# Patient Record
Sex: FEMALE | Race: BLACK OR AFRICAN AMERICAN | Employment: FULL TIME | URBAN - METROPOLITAN AREA
[De-identification: names, ages, dates, MRNs, and addresses within clinical notes are randomized per-mention and may not be internally consistent; named-entity substitution may affect disease eponyms.]

---

## 2017-02-02 ENCOUNTER — DOCUMENTATION ONLY (OUTPATIENT)
Dept: VASCULAR SURGERY | Age: 37
End: 2017-02-02

## 2017-02-02 NOTE — PROGRESS NOTES
1/11/17 I spoke to pt and she was checking dates with her boyfriend to see when he could take off work. I have called back several times and I have not heard back from the pt to RS her surgery.

## 2017-02-22 ENCOUNTER — HOSPITAL ENCOUNTER (OUTPATIENT)
Dept: GENERAL RADIOLOGY | Age: 37
Discharge: HOME | End: 2017-02-22
Payer: MEDICAID

## 2017-02-22 ENCOUNTER — HOSPITAL ENCOUNTER (OUTPATIENT)
Dept: LAB | Age: 37
Discharge: HOME | End: 2017-02-22
Payer: MEDICAID

## 2017-02-22 ENCOUNTER — HOSPITAL ENCOUNTER (EMERGENCY)
Age: 37
Discharge: HOME | End: 2017-02-22
Attending: EMERGENCY MEDICINE
Payer: MEDICAID

## 2017-02-22 ENCOUNTER — HOSPITAL ENCOUNTER (OUTPATIENT)
Dept: LAB | Age: 37
Discharge: HOME | End: 2017-02-22

## 2017-02-22 VITALS
OXYGEN SATURATION: 100 % | BODY MASS INDEX: 37.12 KG/M2 | RESPIRATION RATE: 18 BRPM | SYSTOLIC BLOOD PRESSURE: 154 MMHG | TEMPERATURE: 97.9 F | WEIGHT: 231 LBS | DIASTOLIC BLOOD PRESSURE: 93 MMHG | HEIGHT: 66 IN | HEART RATE: 78 BPM

## 2017-02-22 DIAGNOSIS — J06.9 ACUTE UPPER RESPIRATORY INFECTION: Primary | ICD-10-CM

## 2017-02-22 DIAGNOSIS — J45.909 ASTHMA, ACUTE: ICD-10-CM

## 2017-02-22 LAB — SENTARA SPECIMEN COL,SENBCF: NORMAL

## 2017-02-22 PROCEDURE — 99001 SPECIMEN HANDLING PT-LAB: CPT | Performed by: INTERNAL MEDICINE

## 2017-02-22 PROCEDURE — 99282 EMERGENCY DEPT VISIT SF MDM: CPT

## 2017-02-22 PROCEDURE — 71020 XR CHEST PA LAT: CPT

## 2017-02-22 RX ORDER — AZITHROMYCIN 250 MG/1
TABLET, FILM COATED ORAL
Qty: 6 TAB | Refills: 0 | Status: SHIPPED | OUTPATIENT
Start: 2017-02-22 | End: 2018-03-12 | Stop reason: ALTCHOICE

## 2017-02-22 RX ORDER — HYDROCODONE POLISTIREX AND CHLORPHENIRAMINE POLISTIREX 10; 8 MG/5ML; MG/5ML
5 SUSPENSION, EXTENDED RELEASE ORAL
Qty: 115 ML | Refills: 0 | Status: SHIPPED | OUTPATIENT
Start: 2017-02-22 | End: 2018-03-12 | Stop reason: ALTCHOICE

## 2017-02-22 NOTE — DISCHARGE INSTRUCTIONS
Upper Respiratory Infection (Cold): Care Instructions  Your Care Instructions    An upper respiratory infection, or URI, is an infection of the nose, sinuses, or throat. URIs are spread by coughs, sneezes, and direct contact. The common cold is the most frequent kind of URI. The flu and sinus infections are other kinds of URIs. Almost all URIs are caused by viruses. Antibiotics won't cure them. But you can treat most infections with home care. This may include drinking lots of fluids and taking over-the-counter pain medicine. You will probably feel better in 4 to 10 days. The doctor has checked you carefully, but problems can develop later. If you notice any problems or new symptoms, get medical treatment right away. Follow-up care is a key part of your treatment and safety. Be sure to make and go to all appointments, and call your doctor if you are having problems. It's also a good idea to know your test results and keep a list of the medicines you take. How can you care for yourself at home? · To prevent dehydration, drink plenty of fluids, enough so that your urine is light yellow or clear like water. Choose water and other caffeine-free clear liquids until you feel better. If you have kidney, heart, or liver disease and have to limit fluids, talk with your doctor before you increase the amount of fluids you drink. · Take an over-the-counter pain medicine, such as acetaminophen (Tylenol), ibuprofen (Advil, Motrin), or naproxen (Aleve). Read and follow all instructions on the label. · Before you use cough and cold medicines, check the label. These medicines may not be safe for young children or for people with certain health problems. · Be careful when taking over-the-counter cold or flu medicines and Tylenol at the same time. Many of these medicines have acetaminophen, which is Tylenol. Read the labels to make sure that you are not taking more than the recommended dose.  Too much acetaminophen (Tylenol) can be harmful. · Get plenty of rest.  · Do not smoke or allow others to smoke around you. If you need help quitting, talk to your doctor about stop-smoking programs and medicines. These can increase your chances of quitting for good. When should you call for help? Call 911 anytime you think you may need emergency care. For example, call if:  · You have severe trouble breathing. Call your doctor now or seek immediate medical care if:  · You seem to be getting much sicker. · You have new or worse trouble breathing. · You have a new or higher fever. · You have a new rash. Watch closely for changes in your health, and be sure to contact your doctor if:  · You have a new symptom, such as a sore throat, an earache, or sinus pain. · You cough more deeply or more often, especially if you notice more mucus or a change in the color of your mucus. · You do not get better as expected. Where can you learn more? Go to http://gigi-staci.info/. Enter O936 in the search box to learn more about \"Upper Respiratory Infection (Cold): Care Instructions. \"  Current as of: June 30, 2016  Content Version: 11.1  © 5066-6327 [employer]. Care instructions adapted under license by [employer] (which disclaims liability or warranty for this information). If you have questions about a medical condition or this instruction, always ask your healthcare professional. [place] [address] any warranty or liability for your use of this information. Saline Nasal Washes: Care Instructions  Your Care Instructions  Saline nasal washes help keep the nasal passages open by washing out thick or dried mucus. This simple remedy can help relieve symptoms of allergies, sinusitis, and colds.  It also can make the nose feel more comfortable by keeping the mucous membranes moist. You may notice a little burning sensation in your nose the first few times you use the solution, but this usually gets better in a few days. Follow-up care is a key part of your treatment and safety. Be sure to make and go to all appointments, and call your doctor if you are having problems. It's also a good idea to know your test results and keep a list of the medicines you take. How can you care for yourself at home? · You can buy premixed saline solution in a squeeze bottle or other sinus rinse products at a drugstore. Read and follow the instructions on the label. · You also can make your own saline solution by adding 1 teaspoon of salt and 1 teaspoon of baking soda to 2 cups of distilled water. · If you use a homemade solution, pour a small amount into a clean bowl. Using a rubber bulb syringe, squeeze the syringe and place the tip in the salt water. Pull a small amount of the salt water into the syringe by relaxing your hand. · Sit down with your head tilted slightly back. Do not lie down. Put the tip of the bulb syringe or the squeeze bottle a little way into one of your nostrils. Gently drip or squirt a few drops into the nostril. Repeat with the other nostril. Some sneezing and gagging are normal at first.  · Gently blow your nose. · Wipe the syringe or bottle tip clean after each use. · Repeat this 2 or 3 times a day. · Use nasal washes gently if you have nosebleeds often. When should you call for help? Watch closely for changes in your health, and be sure to contact your doctor if:  · You often get nosebleeds. · You have problems doing the nasal washes. Where can you learn more? Go to http://gigi-staci.info/. Enter [id number] [id number] in the search box to learn more about \"Saline Nasal Washes: Care Instructions. \"  Current as of: July 29, 2016  Content Version: 11.1  © 0788-9659 [employer]. Care instructions adapted under license by [employer] (which disclaims liability or warranty for this information).  If you have questions about a medical condition or this instruction, always ask your healthcare professional. [place] [address] any warranty or liability for your use of this information.

## 2017-02-22 NOTE — ED PROVIDER NOTES
Patient is a 31 y.o. female presenting with cough and back pain. The history is provided by the patient. Cough   This is a new problem. Episode onset: 2 weeks ago. The problem occurs constantly. The problem has been gradually worsening. The cough is productive of sputum. There has been no fever. Associated symptoms include headaches, rhinorrhea, myalgias and wheezing. Pertinent negatives include no chest pain, no chills, no sweats, no weight loss, no eye redness, no ear congestion, no ear pain, no sore throat, no shortness of breath, no nausea, no vomiting and no confusion. She has tried cough syrup, decongestants and inhalers for the symptoms. The treatment provided mild relief. She is not a smoker. Her past medical history is significant for asthma. Back Pain    This is a new problem. The current episode started more than 1 week ago. The problem has not changed since onset. The problem occurs constantly. Patient reports not work related injury. Associated with: Coughing. The pain is present in the thoracic spine. The quality of the pain is described as aching. The pain does not radiate. The pain is at a severity of 6/10. Exacerbated by: Coughing. Associated symptoms include headaches. Pertinent negatives include no chest pain, no fever, no weight loss, no abdominal pain, no abdominal swelling, no bowel incontinence, no perianal numbness, no bladder incontinence, no dysuria, no pelvic pain, no leg pain, no paresthesias, no paresis, no tingling and no weakness. She has tried nothing for the symptoms.  The patient's surgical history non-contributory        Past Medical History:   Diagnosis Date    Acid reflux     Asthma     Depressed     FSGS (focal segmental glomerulosclerosis)     Gestational diabetes     Neuropathy        Past Surgical History:   Procedure Laterality Date    HX CHOLECYSTECTOMY      HX GYN      btl    LAP UMBILICAL HERNIA REPAIR           Family History:   Problem Relation Age of Onset  Diabetes Mother     Hypertension Mother     Stroke Mother     Diabetes Father     Hypertension Father     Cancer Paternal Aunt     Heart Disease Other        Social History     Social History    Marital status: SINGLE     Spouse name: N/A    Number of children: N/A    Years of education: N/A     Occupational History    Not on file. Social History Main Topics    Smoking status: Former Smoker     Packs/day: 1.00     Years: 10.00     Quit date: 8/4/2015    Smokeless tobacco: Former User     Quit date: 3/29/2011    Alcohol use Yes      Comment: social    Drug use: No    Sexual activity: Yes     Partners: Male     Birth control/ protection: Surgical     Other Topics Concern    Not on file     Social History Narrative         ALLERGIES: Review of patient's allergies indicates no known allergies. Review of Systems   Constitutional: Negative for chills, fever and weight loss. HENT: Positive for congestion and rhinorrhea. Negative for ear pain and sore throat. Eyes: Negative for pain and redness. Respiratory: Positive for cough and wheezing. Negative for shortness of breath. Cardiovascular: Negative for chest pain, palpitations and leg swelling. Gastrointestinal: Negative for abdominal pain, bowel incontinence, constipation, diarrhea, nausea and vomiting. Genitourinary: Negative for bladder incontinence, dysuria and pelvic pain. Musculoskeletal: Positive for back pain and myalgias. Negative for arthralgias and gait problem. Skin: Negative. Neurological: Positive for headaches. Negative for dizziness, tingling, weakness, light-headedness and paresthesias. Psychiatric/Behavioral: Negative. Negative for confusion. Vitals:    02/22/17 0927   BP: (!) 154/93   Pulse: 78   Resp: 18   Temp: 97.9 °F (36.6 °C)   SpO2: 100%   Weight: 104.8 kg (231 lb)   Height: 5' 6\" (1.676 m)            Physical Exam   Constitutional: She is oriented to person, place, and time.  She appears well-developed and well-nourished. No distress. HENT:   Head: Normocephalic and atraumatic. Right Ear: Tympanic membrane, external ear and ear canal normal.   Left Ear: Tympanic membrane, external ear and ear canal normal.   Nose: Nose normal.   Mouth/Throat: Oropharynx is clear and moist and mucous membranes are normal. No oropharyngeal exudate. Eyes: Conjunctivae and EOM are normal. Pupils are equal, round, and reactive to light. Right eye exhibits no discharge. Left eye exhibits no discharge. Neck: Normal range of motion. Cardiovascular: Normal rate, regular rhythm, normal heart sounds and intact distal pulses. Exam reveals no gallop and no friction rub. No murmur heard. Pulmonary/Chest: Effort normal and breath sounds normal. No respiratory distress. She has no wheezes. She has no rales. Abdominal: Soft. Bowel sounds are normal. There is no tenderness. Musculoskeletal: Normal range of motion. She exhibits no edema. Cervical back: Normal.        Thoracic back: She exhibits tenderness. She exhibits normal range of motion, no bony tenderness, no swelling, no edema, no deformity, no laceration, no pain, no spasm and normal pulse. Lumbar back: Normal.   Neurological: She is alert and oriented to person, place, and time. Skin: Skin is warm and dry. She is not diaphoretic. Psychiatric: She has a normal mood and affect. Her behavior is normal. Judgment and thought content normal.   Nursing note and vitals reviewed. MDM  Number of Diagnoses or Management Options  Acute upper respiratory infection:   Diagnosis management comments: DDx:  viral vs bacterial URI, asthma exacerbation, COPD, bronchitis, PNE, PE, allergies, pneumothorax, atypical CP, costochondritis/chest wall pain, HF, MI, TAA/AAA, pericarditis, trauma (cardiac contusion, rib Fx, etc), pleuritic chest pain, pleural effusion, intraabdominal process    CXR done today, reviewed, no acute cardiopulmonary process noted. IMPRESSION AND MEDICAL DECISION MAKING:  Based upon the patient's presentation with noted HPI and PE, along with the work up done in the emergency department, I believe that the patient is having a prolonged URI outside the normal time range of viral URI, yet no signs of bronchitis nor pneumonia. Given prolonged time course, will cover with antibiotics. DIAGNOSIS:  1. Upper respiratory infection. SPECIFIC PATIENT INSTRUCTIONS FROM THE PHYSICIAN WHO TREATED YOU IN THE ER TODAY:  1. Return if any concerns or worsening of condition(s)  2. Zithromax as prescribed until finished. 3. Robitussin DM syrup during the day for cough. Use the prescribed Tussionex for cough at night. 4. Over the counter Tylenol for aches and pains and if fever develops. 5. FOLLOW UP APPOINTMENT:  Your primary doctor in 3 days and your pulmonologist in 1 week. Pt results have been reviewed with them. They have been counseled regarding diagnosis, treatment, and plan. Pt verbally conveys understanding and agreement of the signs, symptoms, diagnosis, treatment and prognosis and additionally agrees to follow up as discussed. Pt also agrees with the care-plan and conveys that all of their questions have been answered. I have also provided discharge instructions for them that include: educational information regarding their diagnosis and treatment, and list of reasons why they would want to return to the ED prior to their follow-up appointment, should their condition change.    Kadi Aparicio PA-C 10:23 AM        Amount and/or Complexity of Data Reviewed  Tests in the radiology section of CPT®: reviewed  Discussion of test results with the performing providers: yes  Decide to obtain previous medical records or to obtain history from someone other than the patient: yes  Obtain history from someone other than the patient: yes  Review and summarize past medical records: yes  Discuss the patient with other providers: yes    Risk of Complications, Morbidity, and/or Mortality  Presenting problems: low  Diagnostic procedures: low  Management options: low    Patient Progress  Patient progress: stable    ED Course       Procedures    Diagnosis:   1. Acute upper respiratory infection          Disposition: home    Follow-up Information     Follow up With Details Comments Contact Info    [facility] EMERGENCY DEPT  As needed, If symptoms worsen [address] Hali Bear MD In 3 days  [address] [facility]  [address] [facility] [address] [facility] [facility]      Lorenaelysaniya Reece In 1 week  Pulmonology          Patient's Medications   Start Taking    AZITHROMYCIN (ZITHROMAX Z-ASHLEE) 250 MG TABLET    Take two tablets the first day and then one every day thereafter until completion    CHLORPHENIRAMINE-HYDROCODONE (TUSSIONEX PENNKINETIC ER) 10-8 MG/5 ML SUSPENSION    Take 5 mL by mouth every twelve (12) hours as needed for Cough. Max Daily Amount: 10 mL. Continue Taking    ALBUTEROL (PROVENTIL HFA, VENTOLIN HFA, PROAIR HFA) 90 MCG/ACTUATION INHALER    Take 2 Puffs by inhalation every four (4) hours as needed for Wheezing or Shortness of Breath (or cough). CYANOCOBALAMIN (VITAMIN B-12) 1,000 MCG TABLET    Take 1,000 mcg by mouth daily. ERGOCALCIFEROL (VITAMIN D2) 50,000 UNIT CAPSULE    Take 50,000 Units by mouth every seven (7) days. FERROUS SULFATE (IRON) 325 MG (65 MG IRON) TABLET    Take  by mouth Daily (before breakfast). LORATADINE (CLARITIN) 10 MG TABLET    Take 10 mg by mouth. PANTOPRAZOLE (PROTONIX) 40 MG TABLET    Take 40 mg by mouth daily. PREDNISONE (DELTASONE) 20 MG TABLET    Take 60 mg by mouth daily (with breakfast). These Medications have changed    No medications on file   Stop Taking    OMEGA-3 FATTY ACIDS-VITAMIN E (FISH OIL) 1,000 MG CAP    Take 1 Cap by mouth. OTHER    Unknown inhaler.

## 2017-02-22 NOTE — ED NOTES
Keren Kearney is a 31 y.o. female that was discharged in good condition. The patients diagnosis, condition and treatment were explained to  patient and aftercare instructions were given. The patient verbalized understanding. Patient armband removed and shredded.

## 2017-04-24 ENCOUNTER — HOSPITAL ENCOUNTER (OUTPATIENT)
Dept: LAB | Age: 37
Discharge: HOME | End: 2017-04-24

## 2017-04-24 LAB — SENTARA SPECIMEN COL,SENBCF: NORMAL

## 2017-04-24 PROCEDURE — 99001 SPECIMEN HANDLING PT-LAB: CPT | Performed by: INTERNAL MEDICINE

## 2017-09-05 PROBLEM — E13.9 DIABETES 1.5, MANAGED AS TYPE 2 (HCC): Status: ACTIVE | Noted: 2017-09-05

## 2017-09-05 PROBLEM — F32.A DEPRESSED: Status: ACTIVE | Noted: 2017-09-05

## 2017-09-05 PROBLEM — J45.909 ASTHMA: Status: ACTIVE | Noted: 2017-09-05

## 2017-09-05 PROBLEM — D64.9 ANEMIA: Status: ACTIVE | Noted: 2017-09-05

## 2017-09-05 PROBLEM — N05.1 FSGS (FOCAL SEGMENTAL GLOMERULOSCLEROSIS): Status: ACTIVE | Noted: 2017-09-05

## 2017-09-05 PROBLEM — N92.0 MENORRHAGIA WITH REGULAR CYCLE: Status: ACTIVE | Noted: 2017-09-05

## 2017-12-28 ENCOUNTER — HOSPITAL ENCOUNTER (EMERGENCY)
Age: 37
Discharge: HOME | End: 2017-12-28
Attending: EMERGENCY MEDICINE
Payer: MEDICAID

## 2017-12-28 ENCOUNTER — APPOINTMENT (OUTPATIENT)
Dept: GENERAL RADIOLOGY | Age: 37
End: 2017-12-28
Attending: PHYSICIAN ASSISTANT
Payer: MEDICAID

## 2017-12-28 VITALS
BODY MASS INDEX: 33.91 KG/M2 | SYSTOLIC BLOOD PRESSURE: 141 MMHG | HEART RATE: 84 BPM | HEIGHT: 66 IN | OXYGEN SATURATION: 98 % | WEIGHT: 211 LBS | TEMPERATURE: 98.4 F | DIASTOLIC BLOOD PRESSURE: 88 MMHG | RESPIRATION RATE: 18 BRPM

## 2017-12-28 DIAGNOSIS — J06.9 VIRAL URI WITH COUGH: Primary | ICD-10-CM

## 2017-12-28 LAB
FLUAV AG NPH QL IA: NEGATIVE
FLUBV AG NOSE QL IA: NEGATIVE

## 2017-12-28 PROCEDURE — 99282 EMERGENCY DEPT VISIT SF MDM: CPT

## 2017-12-28 PROCEDURE — 71020 XR CHEST PA LAT: CPT

## 2017-12-28 PROCEDURE — 87081 CULTURE SCREEN ONLY: CPT | Performed by: PHYSICIAN ASSISTANT

## 2017-12-28 PROCEDURE — 87804 INFLUENZA ASSAY W/OPTIC: CPT | Performed by: PHYSICIAN ASSISTANT

## 2017-12-28 RX ORDER — FLUTICASONE PROPIONATE 50 MCG
2 SPRAY, SUSPENSION (ML) NASAL DAILY
Qty: 1 BOTTLE | Refills: 0 | Status: SHIPPED | OUTPATIENT
Start: 2017-12-28 | End: 2018-03-12 | Stop reason: ALTCHOICE

## 2017-12-28 RX ORDER — CODEINE PHOSPHATE AND GUAIFENESIN 10; 100 MG/5ML; MG/5ML
5 SOLUTION ORAL
Qty: 120 ML | Refills: 0 | Status: SHIPPED | OUTPATIENT
Start: 2017-12-28 | End: 2018-03-12 | Stop reason: ALTCHOICE

## 2017-12-29 NOTE — ED PROVIDER NOTES
HPI Comments: 29 yo F c/o nasal congestion, sore throat, and cough x 2 days. Has been taking OTC cold medication without improvement in sx. Denies fever, chills, CP, SOB, n/v. No other complaints. Patient is a 30 y.o. female presenting with sore throat, cough, and nasal congestion. Sore Throat    Associated symptoms include congestion and cough. Pertinent negatives include no vomiting. Cough   Associated symptoms include rhinorrhea and sore throat. Pertinent negatives include no chills, no nausea and no vomiting. Nasal Congestion   Pertinent negatives include no abdominal pain. Past Medical History:   Diagnosis Date    Acid reflux     Anemia     Asthma     Depressed     Diabetes 1.5, managed as type 2 ([facility] [address]     FSGS (focal segmental glomerulosclerosis)     Gestational diabetes     Neuropathy     SAB (spontaneous ) 2004    Vaginal delivery     X2       Past Surgical History:   Procedure Laterality Date    HX CHOLECYSTECTOMY      HX GYN      btl    HX HERNIA REPAIR  2889    LAP UMBILICAL HERNIA REPAIR           Family History:   Problem Relation Age of Onset    Diabetes Mother     Hypertension Mother     Stroke Mother     Asthma Mother     Diabetes Father     Hypertension Father     Cancer Paternal Aunt     Heart Disease Son        Social History     Social History    Marital status: SINGLE     Spouse name: N/A    Number of children: N/A    Years of education: N/A     Occupational History    Not on file.      Social History Main Topics    Smoking status: Former Smoker     Packs/day: 1.00     Years: 10.00     Types: Cigarettes     Quit date: 2017    Smokeless tobacco: Never Used    Alcohol use 3.6 oz/week     3 Glasses of wine, 3 Cans of beer per week      Comment: PER MONTH    Drug use: No    Sexual activity: Yes     Partners: Male     Birth control/ protection: Surgical      Comment: BTL     Other Topics Concern    Not on file     Social History Narrative ALLERGIES: Review of patient's allergies indicates no known allergies. Review of Systems   Constitutional: Negative for chills and fever. HENT: Positive for congestion, rhinorrhea and sore throat. Respiratory: Positive for cough. Gastrointestinal: Negative for abdominal pain, nausea and vomiting. All other systems reviewed and are negative. Vitals:    12/28/17 2138   BP: 141/88   Pulse: 84   Resp: 18   Temp: 98.4 °F (36.9 °C)   SpO2: 98%   Weight: 95.7 kg (211 lb)   Height: 5' 6\" (1.676 m)            Physical Exam   Constitutional: She is oriented to person, place, and time. She appears well-developed and well-nourished. No distress. HENT:   Head: Normocephalic and atraumatic. Right Ear: Tympanic membrane and ear canal normal.   Left Ear: Tympanic membrane and ear canal normal.   Nose: Mucosal edema and rhinorrhea (clear) present. Mouth/Throat: Uvula is midline, oropharynx is clear and moist and mucous membranes are normal.   Eyes: Conjunctivae are normal.   Neck: Normal range of motion. Neck supple. Cardiovascular: Normal rate, regular rhythm and normal heart sounds. Pulmonary/Chest: Effort normal and breath sounds normal. No respiratory distress. She has no wheezes. She has no rales. Musculoskeletal: Normal range of motion. Neurological: She is alert and oriented to person, place, and time. Skin: Skin is warm and dry. Psychiatric: She has a normal mood and affect. Her behavior is normal. Judgment and thought content normal.   Nursing note and vitals reviewed.        MDM  Number of Diagnoses or Management Options  Viral URI with cough:     ED Course       Procedures    -------------------------------------------------------------------------------------------------------------------     EKG INTERPRETATIONS:      RADIOLOGY RESULTS:   XR CHEST PA LAT    (Results Pending)       LABORATORY RESULTS:  Recent Results (from the past 12 hour(s))   STREP THROAT SCREEN    Collection Time: 12/28/17  9:40 PM   Result Value Ref Range    Special Requests: NO SPECIAL REQUESTS      Strep Screen NEGATIVE       Culture result: PENDING    INFLUENZA A & B AG (RAPID TEST)    Collection Time: 12/28/17  9:40 PM   Result Value Ref Range    Influenza A Antigen NEGATIVE  NEG      Influenza B Antigen NEGATIVE  NEG             CONSULTATIONS:        PROGRESS NOTES:    10:39 PM Pt well appearing and in NAD. Flu and strep negative. CXR without acute process. Most likely viral. Will d/h to f/u with PCP for further eval.     Lengthy D/W pt regarding possible worsening of pt's condition, need for follow up and strict ED return instructions for any worsening symptoms. DISPOSITION:  ED DIAGNOSIS & DISPOSITION INFORMATION  Diagnosis:   1. Viral URI with cough          Disposition: home    Follow-up Information     Follow up With Details Comments Contact Info    Sadaf Irvin MD In 1 week For further evaluation [address] [place] [address] EMERGENCY DEPT  Immediately if symptoms worsen [address] [place] [address]  [phone]          Patient's Medications   Start Taking    FLUTICASONE (FLONASE) 50 MCG/ACTUATION NASAL SPRAY    2 Sprays by Both Nostrils route daily. GUAIFENESIN-CODEINE (ROBITUSSIN AC) 100-10 MG/5 ML SOLUTION    Take 5 mL by mouth nightly as needed for Cough. Max Daily Amount: 5 mL. Continue Taking    ALBUTEROL (PROVENTIL HFA, VENTOLIN HFA, PROAIR HFA) 90 MCG/ACTUATION INHALER    Take 2 Puffs by inhalation every four (4) hours as needed for Wheezing or Shortness of Breath (or cough). ALCOHOL SWABS PADM    Test Blood sugar three times a day 30 minutes before meals.  DX E11.9  Machine name One Touch Delica    ALUMINUM CHLORIDE (DRYSOL) 20 % EXTERNAL SOLUTION        AZITHROMYCIN (ZITHROMAX Z-ASHLEE) 250 MG TABLET    Take two tablets the first day and then one every day thereafter until completion    BLOOD-GLUCOSE METER MONITORING KIT    Test Blood sugar three times a day 30 minutes before meals. DX E11.9  Machine name One Touch Delica    BUDESONIDE (RHINOCORT AQUA) 32 MCG/ACTUATION NASAL SPRAY        CHLORPHENIRAMINE-HYDROCODONE (TUSSIONEX PENNKINETIC ER) 10-8 MG/5 ML SUSPENSION    Take 5 mL by mouth every twelve (12) hours as needed for Cough. Max Daily Amount: 10 mL. CHOLECALCIFEROL (VITAMIN D3) 1,000 UNIT TABLET    1,000 Units. CYANOCOBALAMIN (VITAMIN B-12) 1,000 MCG TABLET    Take 1,000 mcg by mouth daily. CYCLOSPORINE MODIFIED (GENGRAF, NEORAL) 25 MG CAPSULE        DIAZEPAM (VALIUM) 5 MG TABLET        ERGOCALCIFEROL (ERGOCALCIFEROL) 50,000 UNIT CAPSULE    50,000 Units. ERGOCALCIFEROL (VITAMIN D2) 50,000 UNIT CAPSULE    Take 50,000 Units by mouth every seven (7) days. FERROUS SULFATE 325 MG (65 MG IRON) TABLET    325 mg. FEXOFENADINE (ALLEGRA) 180 MG TABLET        FLUCONAZOLE (DIFLUCAN) 150 MG TABLET    150 mg. GLUCOSE BLOOD VI TEST STRIPS (ASCENSIA AUTODISC VI, ONE TOUCH ULTRA TEST VI) STRIP    Test Blood sugar three times a day 30 minutes before meals. DX E11.9  Machine name One Touch Delica    LANCETS MISC    Test Blood sugar three times a day 30 minutes before meals. DX E11.9  Machine name One Touch [employer]ica    LISINOPRIL (PRINIVIL, ZESTRIL) 5 MG TABLET        LORATADINE (CLARITIN) 10 MG TABLET    Take 10 mg by mouth. METFORMIN ER (GLUCOPHAGE XR) 500 MG TABLET    TAKE 2 TABS BY MOUTH ONCE A DAY. MONTELUKAST (SINGULAIR) 10 MG TABLET        OMEGA 3-DHA-EPA-FISH OIL (FISH OIL) 60- MG CAP    1,000 mg. PANTOPRAZOLE (PROTONIX) 40 MG TABLET    Take 40 mg by mouth daily. PANTOPRAZOLE (PROTONIX) 40 MG TABLET    40 mg. PREDNISONE (DELTASONE) 20 MG TABLET    Take 60 mg by mouth daily (with breakfast).    These Medications have changed    No medications on file   Stop Taking    No medications on file

## 2017-12-29 NOTE — ED NOTES
Patient and daughter called to Triage 2 for discharge. Patient expressed her displeasure over seeing a PA and her care. Discharge halted, PA asked to speak with patient. Per PA patient requesting x ray. Patient and daughter moved to [facility] for ease of care. Mother states again that she is displeased with care, requesting patient care manager. Charge nurse notified.

## 2017-12-30 LAB
B-HEM STREP THROAT QL CULT: NEGATIVE
BACTERIA SPEC CULT: NORMAL
SERVICE CMNT-IMP: NORMAL

## 2018-02-19 ENCOUNTER — OFFICE VISIT (OUTPATIENT)
Dept: ORTHOPEDIC SURGERY | Age: 38
End: 2018-02-19

## 2018-02-19 VITALS
HEART RATE: 79 BPM | SYSTOLIC BLOOD PRESSURE: 131 MMHG | HEIGHT: 66 IN | TEMPERATURE: 98 F | WEIGHT: 217 LBS | BODY MASS INDEX: 34.87 KG/M2 | OXYGEN SATURATION: 99 % | DIASTOLIC BLOOD PRESSURE: 80 MMHG

## 2018-02-19 DIAGNOSIS — M76.31 IT BAND SYNDROME, RIGHT: ICD-10-CM

## 2018-02-19 DIAGNOSIS — G89.29 CHRONIC PAIN OF LEFT ANKLE: ICD-10-CM

## 2018-02-19 DIAGNOSIS — M76.822 LEFT TIBIALIS POSTERIOR TENDINITIS: Primary | ICD-10-CM

## 2018-02-19 DIAGNOSIS — M25.551 RIGHT HIP PAIN: ICD-10-CM

## 2018-02-19 DIAGNOSIS — M25.572 CHRONIC PAIN OF LEFT ANKLE: ICD-10-CM

## 2018-02-19 DIAGNOSIS — M25.561 ACUTE PAIN OF RIGHT KNEE: ICD-10-CM

## 2018-02-19 NOTE — PROGRESS NOTES
AMBULATORY PROGRESS NOTE      Patient: Meli Wong             MRN: [id number]     SSN: [id number] Body mass index is 35.02 kg/(m^2). YOB: 1980     AGE: 30 y.o. EX: female    PCP: Steven Burroughs MD    IMPRESSION/DIAGNOSIS AND TREATMENT PLAN     DIAGNOSES    1. Left tibialis posterior tendinitis    2. It band syndrome, right    3. Acute pain of right knee    4. Chronic pain of left ankle    5. Right hip pain        Orders Placed This Encounter    [00389] Knee 4V    [79271] Ankle Min 3V    MRI ANKLE LT WO CONT    [51383] Femur, Min 2 Views    [53924] Hip Unilateral with Pelvis 1 VIEW    REFERRAL TO PHYSICAL THERAPY      Bo Harper understands her diagnoses and the proposed plan. As such, in this individual who has had pain and discomfort in her left ankle for many years, I am uncertain of the etiology of her continued pain and discomfort. She has tenderness posteromedially and anterolaterally. My overall impression, however, is posterior tibial tendinitis, actually, and I think with some planovalgus alignment, it may be causing her some sinus tarsi impingement or anterolateral impaction type syndrome laterally. I am recommending an MRI of her left hindfoot to assess her ankle. I am also seeing her for right hip pain and right distal thigh pain, for which I think she has iliotibial band syndrome, for which I am recommending some formal physical therapy. Should her symptoms worsen, of course we will see her sooner. DIAGNOSTIC STUDIES:  She had x-rays of her left ankle, three views. These films show normal alignment of her left ankle, no osteolytic or osteoblastic lesions are seen of her left ankle. No fracture, subluxation, or dislocation. The soft tissue dimensions are well within normal limits when looking at the ankle films, these three views. These are the soft tissue dimensions distally.   However, along the ankle, there is some soft tissue swelling seen at the ankle and medial malleolus and some soft tissue swelling seen laterally, so there is a lucency seen also to the medial malleolus in the AP view. The mortise films show normal alignment of the ankle. The lateral films show normal alignment. There is no fracture, subluxation, or dislocation. The AP of the pelvis, to include the right femur films, show some mild OA of her left hip laterally, and some mild OA of her right hip socket laterally. Otherwise, I see no osteolytic or osteoblastic lesions are seen to her full-length, right femur films. Right knee films:  There is some mild medial joint space narrowing of the right knee. No fracture, subluxation, or dislocation. The joint spaces are well-preserved. The patellofemoral joint articulation appears to be fairly well preserved. There is just some slight lateralization of the patella. The left knee films, comparison, shows the medial and lateral joint spaces are well-maintained. There is just slight narrowing medially, but otherwise, no osteolytic or osteoblastic lesions are seen to the left knee on her comparison left knee films today. IMPRESSION:  A 56-year-old female with:    1. Right IT band tendinitis. Recommendation is for formal physical therapy. 2. Left medial ankle pain, lateral ankle pain, posterior tibial tendinitis. I am concerned about the lucency I see in the medial malleolar region. Recommendation is for an MRI of her left hindfoot that includes her ankle to assess her medial malleolus, medial deltoid structures, posterior tibial tendon, and the anterolateral structures of her left ankle and the sinus tarsi region primarily. Plan:    1) MRI without IV Contrast of the Left ankle  2) Referral for Physical Therapy: Low frequency US, Graston technique.      RTO - After MRI    HPI AND EXAMINATION     Bo Harper IS A 30 y.o. female who presents to my outpatient office complaining of left ankle and right knee pain. Patient reports that she had pain to her left ankle for several years. She notes popping and swelling to her left ankle. Denies h/o RA, Lupus, psoriasis. FHx of gout from parents. She notes the pain is consistent throughout the day and she sleeps with her left ankle brace on. She reports that the brace and remaining nonweightbearing provides relief. She states the chronic discomfort arises from the posteromedial and anterolateral ankle. Additionally she c/o left ankle instability. As it regards her right knee. She notes having new pain when she lifts her knee and shooting pain that can extend proximally. Denies any redness or swelling. No trauma, twists, or falls involving the right knee. Of note, she mentioned a lot of her discomfort comes when she is driving and it can extend to her right hip. 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 ANKLE/FOOT left    Psychiatry: Alert, Oriented x 3; Speech normal in context and clarity,            Memory intact grossly, no involuntary movements - tremors, no dementia  Gait: slow  Tenderness: moderate tenderness to posterior tibial tendon, no to plantar fascia, no PM spring ligament, no for calf or gastrocnemius muscle regions. Moderate tenderness to anterolateral ankle. Cutaneous: mild swelling to posterior tibial tendon,            otherwise WNL   Joint Motion: Normal ROM noted to ankle/foot,  Inversion motion is not diminished. Joint / Tendon Stability: No Ankle or Subtalar instability or joint laxity.                        No peroneal sublux ability or dislocation  Alignment: Hindfoot remains in neutral position with single stance rise attempt    Hindfoot alignment when patient seated: neutral         Calcaneo fibular Impingement is not present with weight bearing   Abduction of hindfoot at TN not present, midfoot not present          Medial column collapse not present, planovalgus alignment is present          Forefoot compensatory Varus is not present  Contractures:  Achilles not present, Gastrocnemius Contractures not present  Neuro Motor/Sensory: NL/NL. Cannot Single Stance Rise. Vascular: NL foot/ankle pulses  Lymphatics: No extremity lymphedema, No calf swelling, no tenderness to calf muscles. Knees:  Right       Cutaneous: Skin intact, no abrasions, blisters, wounds, erythema       Effusion: Is not present       Crepitus:  mild PF joint crepitus       Tenderness: Tenderness: Lateral retinaculum           Tenderness to distal third of the IT band. Alignment of Knee: neutral when standing       ROM: full range of motion, pain with figure 4. Fullness or swelling: None to popliteal fossa region       Stability: No instability to anterior, posterior, varus, valgus stress testing       Thrust:  No varus thrust with gait       Neuro: Extensor mechanism is intact      HIP REGION: right    Gait slow  Cutaneous: Skin intact, redness not present, erythema not present,drainage not present , rash not present  Visible swelling: not present  ROM: Normal IR, Normal ER   Normal FLEXION, Normal EXTENSION   Instability: none noted  Effusion: difficult to assess due to soft tissues  Crepitus Knee:  PF joint crepitus not noted  Tenderness: to Groin not present, GT region present, to distal femur not present   Fullness: Popliteal region not present  Deformity: not present    CHART REVIEW     Past Medical History:   Diagnosis Date    Acid reflux     Anemia     Asthma     Depressed     [facility] 1[facility]     FSGS (focal segmental glomerulosclerosis)     Gestational diabetes     Neuropathy     SAB (spontaneous ) 2004    Vaginal delivery     X2     Current Outpatient Prescriptions   Medication Sig    fluticasone (FLONASE) 50 mcg/actuation nasal spray 2 Sprays by Both Nostrils route daily.  omega 3-dha-epa-fish oil (FISH OIL) 60- mg cap 1,000 mg.     budesonide (RHINOCORT AQUA) 32 mcg/actuation nasal spray  alcohol swabs padm Test Blood sugar three times a day 30 minutes before meals. DX E11.9  Machine name One Touch Delica    glucose blood VI test strips (ASCENSIA AUTODISC VI, ONE TOUCH ULTRA TEST VI) strip Test Blood sugar three times a day 30 minutes before meals. DX E11.9  Machine name One Touch Delica    Blood-Glucose Meter monitoring kit Test Blood sugar three times a day 30 minutes before meals. DX E11.9  Machine name One Touch Delica    cholecalciferol (VITAMIN D3) 1,000 unit tablet 1,000 Units.  cycloSPORINE modified (GENGRAF, NEORAL) 25 mg capsule     Lancets misc Test Blood sugar three times a day 30 minutes before meals. DX E11.9  Machine name One Touch Delica    metFORMIN ER (GLUCOPHAGE XR) 500 mg tablet TAKE 2 TABS BY MOUTH ONCE A DAY.  montelukast (SINGULAIR) 10 mg tablet     ferrous sulfate 325 mg (65 mg iron) tablet 325 mg.    ergocalciferol (ERGOCALCIFEROL) 50,000 unit capsule 50,000 Units.  cyanocobalamin (VITAMIN B-12) 1,000 mcg tablet Take 1,000 mcg by mouth daily.  ergocalciferol (VITAMIN D2) 50,000 unit capsule Take 50,000 Units by mouth every seven (7) days.  albuterol (PROVENTIL HFA, VENTOLIN HFA, PROAIR HFA) 90 mcg/actuation inhaler Take 2 Puffs by inhalation every four (4) hours as needed for Wheezing or Shortness of Breath (or cough).  pantoprazole (PROTONIX) 40 mg tablet Take 40 mg by mouth daily.  guaiFENesin-codeine (ROBITUSSIN AC) 100-10 mg/5 mL solution Take 5 mL by mouth nightly as needed for Cough. Max Daily Amount: 5 mL.  diazePAM (VALIUM) 5 mg tablet     aluminum chloride (DRYSOL) 20 % external solution     fexofenadine (ALLEGRA) 180 mg tablet     fluconazole (DIFLUCAN) 150 mg tablet 150 mg.    lisinopril (PRINIVIL, ZESTRIL) 5 mg tablet     pantoprazole (PROTONIX) 40 mg tablet 40 mg.    chlorpheniramine-HYDROcodone (TUSSIONEX PENNKINETIC ER) 10-8 mg/5 mL suspension Take 5 mL by mouth every twelve (12) hours as needed for Cough.  Max Daily Amount: 10 mL.  azithromycin (ZITHROMAX Z-ASHLEE) 250 mg tablet Take two tablets the first day and then one every day thereafter until completion    predniSONE (DELTASONE) 20 mg tablet Take 60 mg by mouth daily (with breakfast).  loratadine (CLARITIN) 10 mg tablet Take 10 mg by mouth. No current facility-administered medications for this visit. No Known Allergies  Past Surgical History:   Procedure Laterality Date    HX CHOLECYSTECTOMY      HX GYN      btl    HX HERNIA REPAIR  [id number]    LAP UMBILICAL HERNIA REPAIR       Social History     Occupational History    Not on file. Social History Main Topics    Smoking status: Former Smoker     Packs/day: 1.00     Years: 10.00     Types: Cigarettes     Quit date: 2/4/2017    Smokeless tobacco: Never Used    Alcohol use 3.6 oz/week     3 Glasses of wine, 3 Cans of beer per week      Comment: PER MONTH    Drug use: No    Sexual activity: Yes     Partners: Male     Birth control/ protection: Surgical      Comment: BTL     Family History   Problem Relation Age of Onset    Diabetes Mother     Hypertension Mother     Stroke Mother     Asthma Mother     Diabetes Father     Hypertension Father     Cancer Paternal Aunt     Heart Disease Son        REVIEW OF SYSTEMS : 2/19/2018  ALL BELOW ARE Negative except : SEE HPI       Constitutional: Negative for fever, chills and weight loss. Neg Weigh Loss  Cardiovascular: Negative for chest pain, claudication and leg swelling. SOB, SMITH   Gastrointestinal: Negative for  pain, N/V/D/C, Blood in stool or urine,dysuria, hematuria,        Incontinence, pelvic pain  Musculoskeletal: see HPI. Neurological: Negative for dizziness and weakness. Negative for headaches,Visual Changes, Confusion, Seizures,   Psychiatric/Behavioral: Negative for depression, memory loss and substance abuse. Extremities:  Negative for  hair changes, rash or skin lesion changes.   Hematologic: Negative for Bleeding problems, bruising, pallor or swollen lymph nodes. Peripheral Vascular: No calf pain, vascular vein tenderness to calf pain              No calf throbbing, posterior knee throbbing pain    DIAGNOSTIC IMAGING     No notes on file    [facility]     FINAL REPORT       Name: Daniel Snyder  MRN: [id number]       Outpatient  : 08 Sep 1980  HIS Order #: [id number]  [id number] [address] [employer] Visit #: [id number]  Date: 2016     TYPE OF TEST: Peripheral Venous Testing     REASON FOR TEST  Pain in limb, Limb swelling     Left Leg:-  Deep venous thrombosis:           No  Superficial venous thrombosis:    No  Deep venous insufficiency:        Yes  Superficial venous insufficiency: No     Abnormal Valve Closure Times (seconds):    Left Common Femoral:  2.5     Vein Mapping:    Diam.   Depth  (mm)     Left Great Saphenous Vein:    High Thigh:    Mid Thigh:    Low Thigh:    Knee:    High Calf:    Low Calf: Ankle:     Left Small Saphenous Vein:    SPJ:    Mid Calf: Ankle:     Giacomini:  Accessory saph.:  Berg :  Maloney :        INTERPRETATION/FINDINGS  Duplex images were obtained using 2-D gray scale, color flow and  spectral doppler analysis. 1. No evidence of left lower extremity deep venous thrombosis or  reflux  in the common femoral, femoral, popliteal, posterior tibial or   peroneal veins. Deep venous reflux of 2.5 seconds noted in the left  common femoral vein. 2. No evidence of superficial venous insufficiency identified at the  sapheno femoral or sapheno popliteal junctions in reverse  trendelenburg. 3. No reflux detected in the remaining GSV or small saphenous veins  segments assessed. 4. Multiple competent branches/tributaries noted in the thigh  measuring 3.8 mm  and 2.3 mm at the calf. 5. Patent contralateral common femoral vein without evidence of  thrombosis.   6. Triphasic doppler signals noted in the tibial vessel.     ADDITIONAL COMMENTS     I have personally reviewed the data relevant to the interpretation of  this  study.     TECHNOLOGIST: Ortega Webber RVT, BS  Signed: 11/02/2016 11:59 AM     PHYSICIAN: Junito Saleem MD  Signed: 11/02/2016 04:25 PM    Written by Jackie Glez, as dictated by Nico Johnston MD. IDr., Nico Johnston MD, confirm that all documentation is accurate.

## 2018-02-19 NOTE — PATIENT INSTRUCTIONS
Please follow up after MRI. You are advised to contact us if your condition worsens. An MRI or CT has been ordered for you. A [employer] will be contacting you to schedule the appointment as soon as it has been approved with your insurance company. Please schedule an appointment to follow up with the doctor or the physicians assistant after the MRI or CT has been conducted. Joint Pain: Care Instructions  Your Care Instructions    Many people have small aches and pains from overuse or injury to muscles and joints. Joint injuries often happen during sports or recreation, work tasks, or projects around the home. An overuse injury can happen when you put too much stress on a joint or when you do an activity that stresses the joint over and over, such as using the computer or rowing a boat. You can take action at home to help your muscles and joints get better. You should feel better in 1 to 2 weeks, but it can take 3 months or more to heal completely. Follow-up care is a key part of your treatment and safety. Be sure to make and go to all appointments, and call your doctor if you are having problems. It's also a good idea to know your test results and keep a list of the medicines you take. How can you care for yourself at home? · Do not put weight on the injured joint for at least a day or two. · For the first day or two after an injury, do not take hot showers or baths, and do not use hot packs. The heat could make swelling worse. · Put ice or a cold pack on the sore joint for 10 to 20 minutes at a time. Try to do this every 1 to 2 hours for the next 3 days (when you are awake) or until the swelling goes down. Put a thin cloth between the ice and your skin. · Wrap the injury in an elastic bandage. Do not wrap it too tightly because this can cause more swelling. · Prop up the sore joint on a pillow when you ice it or anytime you sit or lie down during the next 3 days.  Try to keep it above the level of your heart. This will help reduce swelling. · Take an over-the-counter pain medicine, such as acetaminophen (Tylenol), ibuprofen (Advil, Motrin), or naproxen (Aleve). Read and follow all instructions on the label. · After 1 or 2 days of rest, begin moving the joint gently. While the joint is still healing, you can begin to exercise using activities that do not strain or hurt the painful joint. When should you call for help? Call your doctor now or seek immediate medical care if:  ? · You have signs of infection, such as:  ¨ Increased pain, swelling, warmth, and redness. ¨ Red streaks leading from the joint. ¨ A fever. ? Watch closely for changes in your health, and be sure to contact your doctor if:  ? · Your movement or symptoms are not getting better after 1 to 2 weeks of home treatment. Where can you learn more? Go to http://gigi-staci.info/. Enter P205 in the search box to learn more about \"Joint Pain: Care Instructions. \"  Current as of: March 21, 2017  Content Version: 11.4  © 1158-0502 [employer]. Care instructions adapted under license by [employer] (which disclaims liability or warranty for this information). If you have questions about a medical condition or this instruction, always ask your healthcare professional. [place] [address] any warranty or liability for your use of this information.

## 2018-02-19 NOTE — MR AVS SNAPSHOT
[address], [address] 200 [facility] 
[phone] Patient: Tete Velasquez MRN: [id number] Visit Information Date & Time Provider Department Dept. Phone Encounter #  
 2/19/2018  9:20 AM MD [facility] [phone] Your Appointments 3/12/2018  9:30 AM  
New Patient with 127 Edward Bill MD  
[facility] Appt Note: mid/low back pain, nx, arrive 9am w/ins & id  
 [address] [facility] [address]  
[phone]  
  
   
 [address] [facility] [address] Upcoming Health Maintenance Date Due HEMOGLOBIN A1C Q6M 1980 LIPID PANEL Q1 1980 FOOT EXAM Q1 9/8/1992 MICROALBUMIN Q1 9/8/1992 EYE EXAM RETINAL OR DILATED Q1 9/8/1992 Pneumococcal 19-64 Medium Risk (1 of 1 - PPSV23) 9/8/2001 DTaP/Tdap/Td series (1 - Tdap) 9/8/2003 PAP AKA CERVICAL CYTOLOGY 9/20/2015 Allergies as of 2/19/2018  Review Complete On: 12/28/2017 By: Song Chatman RN No Known Allergies Current Immunizations  Never Reviewed No immunizations on file. Not reviewed this visit You Were Diagnosed With   
  
 Codes Comments Acute pain of right knee    -  Primary ICD-10-CM: M25.561 ICD-9-CM: 719.46 Chronic pain of left ankle     ICD-10-CM: M25.572, G89.29 ICD-9-CM: 719.47, 338.29 Left tibialis posterior tendinitis     ICD-10-CM: G53.617 ICD-9-CM: 726.72 Right hip pain     ICD-10-CM: M25.551 ICD-9-CM: 719.45 Vitals BP Pulse Temp Height(growth percentile) Weight(growth percentile) SpO2  
 131/80 79 98 °F (36.7 °C) (Oral) 5' 6\" (1.676 m) 217 lb (98.4 kg) 99% BMI OB Status Smoking Status 35.02 kg/m2 Having regular periods Former Smoker BMI and BSA Data Body Mass Index Body Surface Area 35.02 kg/m 2 2.14 m 2 Preferred Pharmacy Pharmacy Name Phone [facility][id number] Andrei Turk, [address] [facility] [facility] [phone] Your Updated Medication List  
  
   
This list is accurate as of: 2/19/18 10:59 AM.  Always use your most recent med list.  
  
  
  
  
 albuterol 90 mcg/actuation inhaler Commonly known as:  PROVENTIL HFA, VENTOLIN HFA, PROAIR HFA Take 2 Puffs by inhalation every four (4) hours as needed for Wheezing or Shortness of Breath (or cough). alcohol swabs Padm Test Blood sugar three times a day 30 minutes before meals. DX E11.9  Machine name One Touch Delica  
  
 azithromycin 250 mg tablet Commonly known as:  Ed Flow Take two tablets the first day and then one every day thereafter until completion Blood-Glucose Meter monitoring kit Test Blood sugar three times a day 30 minutes before meals. DX E11.9  Machine name One Touch Delica  
  
 budesonide 32 mcg/actuation nasal spray Commonly known as:  RHINOCORT AQUA  
  
 chlorpheniramine-HYDROcodone 10-8 mg/5 mL suspension Commonly known as:  Marthenia Likes ER Take 5 mL by mouth every twelve (12) hours as needed for Cough. Max Daily Amount: 10 mL. cholecalciferol 1,000 unit tablet Commonly known as:  VITAMIN D3  
1,000 Units. CLARITIN 10 mg tablet Generic drug:  loratadine Take 10 mg by mouth. cycloSPORINE modified 25 mg capsule Commonly known as:  GENGRAF, NEORAL  
  
 diazePAM 5 mg tablet Commonly known as:  VALIUM Drysol 20 % external solution Generic drug:  aluminum chloride  
  
 ferrous sulfate 325 mg (65 mg iron) tablet 325 mg.  
  
 fexofenadine 180 mg tablet Commonly known as:  ALLEGRA  
  
 FISH OIL 60- mg Cap Generic drug:  omega 3-dha-epa-fish oil  
1,000 mg.  
  
 fluconazole 150 mg tablet Commonly known as:  DIFLUCAN  
150 mg.  
  
 fluticasone 50 mcg/actuation nasal spray Commonly known as:  Juanis Manners 2 Sprays by Both Nostrils route daily. glucose blood VI test strips strip Commonly known as:  ASCENSIA AUTODISC VI, ONE TOUCH ULTRA TEST VI Test Blood sugar three times a day 30 minutes before meals. DX E11.9  Machine name One Touch Delica  
  
 guaiFENesin-codeine 100-10 mg/5 mL solution Commonly known as:  ROBITUSSIN AC Take 5 mL by mouth nightly as needed for Cough. Max Daily Amount: 5 mL. Lancets Misc Test Blood sugar three times a day 30 minutes before meals. DX E11.9  Machine name One Touch Delica  
  
 lisinopril 5 mg tablet Commonly known as:  PRINIVIL, ZESTRIL  
  
 metFORMIN  mg tablet Commonly known as:  GLUCOPHAGE XR  
TAKE 2 TABS BY MOUTH ONCE A DAY. montelukast 10 mg tablet Commonly known as:  SINGULAIR * pantoprazole 40 mg tablet Commonly known as:  PROTONIX Take 40 mg by mouth daily. * pantoprazole 40 mg tablet Commonly known as:  PROTONIX  
40 mg.  
  
 predniSONE 20 mg tablet Commonly known as:  Antonieta Nicol Take 60 mg by mouth daily (with breakfast). VITAMIN B-12 1,000 mcg tablet Generic drug:  cyanocobalamin Take 1,000 mcg by mouth daily. * VITAMIN D2 50,000 unit capsule Generic drug:  ergocalciferol Take 50,000 Units by mouth every seven (7) days. * ergocalciferol 50,000 unit capsule Commonly known as:  ERGOCALCIFEROL  
50,000 Units. * Notice: This list has 4 medication(s) that are the same as other medications prescribed for you. Read the directions carefully, and ask your doctor or other care provider to review them with you. We Performed the Following AMB POC X-RAY RADEX HIP UNILATERAL WITH PELVIS 1 VIEW [23886 CPT(R)] AMB POC XRAY, ANKLE; COMPLETE, 3+ VIE [69686 CPT(R)] AMB POC XRAY, FEMUR, MIN 2 VIEWS [76346 CPT(R)] AMB POC XRAY, KNEE; COMPLETE, 4+ VIEW [36799 CPT(R)] REFERRAL TO PHYSICAL THERAPY [QAY76 Custom] Comments: Evaluation and treatment of IT band tendinitis. Please treat two to three times a week for four weeks. Please utilize the following: low frequency US, and Graston technique. To-Do List   
 02/19/2018 Imaging:  MRI ANKLE LT WO CONT Referral Information Referral ID Referred By Referred To  
  
 [id number] Rambo Simpsonnolan Not Available Visits Status Start Date End Date 1 New Request 2/19/18 2/19/19 If your referral has a status of pending review or denied, additional information will be sent to support the outcome of this decision. Referral ID Referred By Referred To  
 [id number] CHARLENE AGUILERA [address]  
   [address] [facility] [address], [address] [facility] Phone: [phone] Fax: [phone] Visits Status Start Date End Date 1 New Request 2/19/18 2/19/19 If your referral has a status of pending review or denied, additional information will be sent to support the outcome of this decision. Patient Instructions Please follow up after MRI. You are advised to contact us if your condition worsens. An MRI or CT has been ordered for you. [employer] will be contacting you to schedule the appointment as soon as it has been approved with your insurance company. Please schedule an appointment to follow up with the doctor or the physicians assistant after the MRI or CT has been conducted. Joint Pain: Care Instructions Your Care Instructions Many people have small aches and pains from overuse or injury to muscles and joints. Joint injuries often happen during sports or recreation, work tasks, or projects around the home. An overuse injury can happen when you put too much stress on a joint or when you do an activity that stresses the joint over and over, such as using the computer or rowing a boat. You can take action at home to help your muscles and joints get better. You should feel better in 1 to 2 weeks, but it can take 3 months or more to heal completely. Follow-up care is a key part of your treatment and safety. Be sure to make and go to all appointments, and call your doctor if you are having problems. It's also a good idea to know your test results and keep a list of the medicines you take. How can you care for yourself at home? · Do not put weight on the injured joint for at least a day or two. · For the first day or two after an injury, do not take hot showers or baths, and do not use hot packs. The heat could make swelling worse. · Put ice or a cold pack on the sore joint for 10 to 20 minutes at a time. Try to do this every 1 to 2 hours for the next 3 days (when you are awake) or until the swelling goes down. Put a thin cloth between the ice and your skin. · Wrap the injury in an elastic bandage. Do not wrap it too tightly because this can cause more swelling. · Prop up the sore joint on a pillow when you ice it or anytime you sit or lie down during the next 3 days. Try to keep it above the level of your heart. This will help reduce swelling. · Take an over-the-counter pain medicine, such as acetaminophen (Tylenol), ibuprofen (Advil, Motrin), or naproxen (Aleve). Read and follow all instructions on the label. · After 1 or 2 days of rest, begin moving the joint gently. While the joint is still healing, you can begin to exercise using activities that do not strain or hurt the painful joint. When should you call for help? Call your doctor now or seek immediate medical care if: 
? · You have signs of infection, such as: 
¨ Increased pain, swelling, warmth, and redness. ¨ Red streaks leading from the joint. ¨ A fever. ? Watch closely for changes in your health, and be sure to contact your doctor if: 
? · Your movement or symptoms are not getting better after 1 to 2 weeks of home treatment. Where can you learn more? Go to http://gigi-satci.info/. Enter P205 in the search box to learn more about \"Joint Pain: Care Instructions. \" Current as of: March 21, 2017 Content Version: 11.4 © 4457-4815 [employer]. Care instructions adapted under license by [employer] (which disclaims liability or warranty for this information). If you have questions about a medical condition or this instruction, always ask your healthcare professional. [address] any warranty or liability for your use of this information. Introducing [facility]! Cristian Ford introduces [employer] patient portal. Now you can access parts of your medical record, email your doctor's office, and request medication refills online. 1. In your internet browser, go to [email or web address] 2. Click on the First Time User? Click Here link in the Sign In box. You will see the New Member Sign Up page. 3. Enter your [employer] Access Code exactly as it appears below. You will not need to use this code after youve completed the sign-up process. If you do not sign up before the expiration date, you must request a new code. · [employer] Access Code: [email or web address] Expires: 3/15/2018  9:53 AM 
 
4. Enter the last four digits of your Social Security Number (xxxx) and Date of Birth (mm/dd/yyyy) as indicated and click Submit. You will be taken to the next sign-up page. 5. Create a [employer] ID. This will be your [employer] login ID and cannot be changed, so think of one that is secure and easy to remember. 6. Create a [employer] password. You can change your password at any time. 7. Enter your Password Reset Question and Answer. This can be used at a later time if you forget your password. 8. Enter your e-mail address. You will receive e-mail notification when new information is available in [address] 9. Click Sign Up. You can now view and download portions of your medical record. 10. Click the Download Summary menu link to download a portable copy of your medical information. If you have questions, please visit the Frequently Asked Questions section of the [employer] website. Remember, [employer] is NOT to be used for urgent needs. For medical emergencies, dial 911. Now available from your iPhone and Android! Please provide this summary of care documentation to your next provider. Your primary care clinician is listed as Jessica Chen. If you have any questions after today's visit, please call [phone].

## 2018-03-12 ENCOUNTER — OFFICE VISIT (OUTPATIENT)
Dept: ORTHOPEDIC SURGERY | Age: 38
End: 2018-03-12

## 2018-03-12 VITALS
WEIGHT: 218.6 LBS | HEART RATE: 73 BPM | HEIGHT: 66 IN | BODY MASS INDEX: 35.13 KG/M2 | DIASTOLIC BLOOD PRESSURE: 81 MMHG | TEMPERATURE: 98.4 F | RESPIRATION RATE: 18 BRPM | OXYGEN SATURATION: 100 % | SYSTOLIC BLOOD PRESSURE: 131 MMHG

## 2018-03-12 DIAGNOSIS — M54.16 LUMBAR RADICULOPATHY: Primary | ICD-10-CM

## 2018-03-12 RX ORDER — DULOXETIN HYDROCHLORIDE 30 MG/1
CAPSULE, DELAYED RELEASE ORAL
Qty: 30 CAP | Refills: 0 | Status: SHIPPED | OUTPATIENT
Start: 2018-03-12 | End: 2019-05-08

## 2018-03-12 NOTE — PROGRESS NOTES
[address]  [address]  [place] [address]  Phone: [phone]  Fax: [phone]        Odalis Cardozo  : 1980  PCP: Luis Gonzalez MD      NEW PATIENT      ASSESSMENT AND PLAN     Diagnoses and all orders for this visit:    1. Lumbar radiculopathy  -     [87792] LS Spine 4V  -     DULoxetine (CYMBALTA) 30 mg capsule; 1 tab PO Q dinner    1. Advised to stay active as tolerated. 2. Trial of Cymbalta. Pt is unsure if she wishes to try this. Will call office. 3. Referral to physical therapy. 4. Pt declines Toradol injections in the office today. 5. Given information on preventing injuries. Follow-up Disposition:  Return in about 4 weeks (around 2018). CHIEF COMPLAINT  Angel Holley is seen today in consultation as self referral for complaints of low back pain       HISTORY OF PRESENT ILLNESS  Angel Holley is a 30 y.o. female. Today pt c/o back pain of 2 years duration. Pt denies any specific incident or injury that caused their pain. Her back pain bothers her every day. She has not had any coughs from her environmental allergies. She states that the middle of her low back is worse than the sides. Her pain is worse with any movement. She has not seen a chiropractor or any other physician for her pain. Pt reports pain does radiate into BLE. She has pain into her hips as well. She admits to joint swelling/tightness and pain. Pt has weakness in BLE. Pt denies saddle paresthesias. Has not had recent prednisone. Has had cortisone injections in her wrist which increased her pain. Pt in tears in the office today due to pain. Pt has tried; PT-Yes,  Non-opioid medications Yes, and spinal injections No.  Denies persistent fevers, chills, weight changes, neurogenic bowel or bladder symptoms. Pt denies recent ED visits or hospitalizations. PMHx of DM, neuropathy, renal disease.  reviewed.  Last controlled substance was in 2017 T#3 #15. She was seen by Dr. Laura Bates 3 weeks ago for hip, ankle, and knee pain. Chemistries from 2017: Normal creatinine. Is not working, last worked 2018. She was working as a CNA. Family Hx of arthritis. JAYLA screen was negative. Hx of depression, no hospitalizations. No SI/HI. Pain Assessment  3/12/2018   Location of Pain Back;Buttocks;Leg;Other (comment)   Pain Location Comment lower back, both legs   Location Modifiers -   Severity of Pain 6   Quality of Pain Aching   Quality of Pain Comment -   Duration of Pain Persistent   Duration of Pain Comment -   Frequency of Pain Constant   Date Pain First Started -   Aggravating Factors Stairs; Walking;Standing;Squatting;Kneeling;Exercise;Straightening;Stretching;Bending   Aggravating Factors Comment -   Limiting Behavior -   Relieving Factors Rest   Relieving Factors Comment -   Result of Injury No         PAST MEDICAL HISTORY   Past Medical History:   Diagnosis Date    Acid reflux     Anemia     Asthma     Depressed     Diabetes 1.5, managed as type [facility] ([facility] [address]     FSGS (focal segmental glomerulosclerosis)     Gestational diabetes     Neuropathy     SAB (spontaneous )     Vaginal delivery     X2       Past Surgical History:   Procedure Laterality Date    HX CHOLECYSTECTOMY      HX GYN      btl    HX HERNIA REPAIR  2673    LAP UMBILICAL HERNIA REPAIR         MEDICATIONS      Current Outpatient Prescriptions   Medication Sig Dispense Refill    DULoxetine (CYMBALTA) 30 mg capsule 1 tab PO Q dinner 30 Cap 0    omega 3-dha-epa-fish oil (FISH OIL) 60- mg cap 1,000 mg.  budesonide (RHINOCORT AQUA) 32 mcg/actuation nasal spray       alcohol swabs padm Test Blood sugar three times a day 30 minutes before meals. DX E11.9  Machine name One Touch Delica      glucose blood VI test strips (ASCENSIA AUTODISC VI, ONE TOUCH ULTRA TEST VI) strip Test Blood sugar three times a day 30 minutes before meals.  DX E11.9  Machine name One Touch Delica      Blood-Glucose Meter monitoring kit Test Blood sugar three times a day 30 minutes before meals. DX E11.9  Machine name One Touch Delica      cholecalciferol (VITAMIN D3) 1,000 unit tablet 1,000 Units.  cycloSPORINE modified (GENGRAF, NEORAL) 25 mg capsule       Lancets misc Test Blood sugar three times a day 30 minutes before meals. DX E11.9  Machine name One Touch Delica      metFORMIN ER (GLUCOPHAGE XR) 500 mg tablet TAKE 2 TABS BY MOUTH ONCE A DAY.  montelukast (SINGULAIR) 10 mg tablet       ferrous sulfate 325 mg (65 mg iron) tablet 325 mg.      cyanocobalamin (VITAMIN B-12) 1,000 mcg tablet Take 1,000 mcg by mouth daily.  pantoprazole (PROTONIX) 40 mg tablet Take 40 mg by mouth daily. ALLERGIES  No Known Allergies       SOCIAL HISTORY    Social History     Social History    Marital status: SINGLE     Spouse name: N/A    Number of children: N/A    Years of education: N/A     Occupational History    Not on file. Social History Main Topics    Smoking status: Former Smoker     Packs/day: 1.00     Years: 10.00     Types: Cigarettes     Quit date: 2/4/2017    Smokeless tobacco: Never Used    Alcohol use 3.6 oz/week     3 Glasses of wine, 3 Cans of beer per week      Comment: PER MONTH    Drug use: No    Sexual activity: Yes     Partners: Male     Birth control/ protection: Surgical      Comment: BTL     Other Topics Concern    Not on file     Social History Narrative       FAMILY HISTORY    Family History   Problem Relation Age of Onset    Diabetes Mother     Hypertension Mother     Stroke Mother     Asthma Mother     Diabetes Father     Hypertension Father     Cancer Paternal Aunt     Heart Disease Son          REVIEW OF SYSTEMS  Review of Systems   Constitutional: Negative for chills, fever and weight loss. Respiratory: Negative for shortness of breath. Cardiovascular: Negative for chest pain. Gastrointestinal: Negative for constipation. Negative for fecal incontinence   Genitourinary: Negative for dysuria. Negative for urinary incontinence   Musculoskeletal:        Per HPI   Skin: Negative for rash. Neurological: Positive for focal weakness. Negative for dizziness, tingling, tremors and headaches. Endo/Heme/Allergies: Does not bruise/bleed easily. Psychiatric/Behavioral: Positive for depression. The patient does not have insomnia. PHYSICAL EXAMINATION  Visit Vitals    /81    Pulse 73    Temp 98.4 °F (36.9 °C) (Oral)    Resp 18    Ht 5' 6\" (1.676 m)    Wt 218 lb 9.6 oz (99.2 kg)    SpO2 100%    BMI 35.28 kg/m2          Accompanied by self. Constitutional:  Well developed, well nourished, in no acute distress. Psychiatric: Affect and mood are appropriate. Integumentary: No rashes or abrasions noted on exposed areas. Cardiovascular/Peripheral Vascular: Intact l pulses. No peripheral edema is noted. Lymphatic:  No evidence of lymphedema. No cervical lymphadenopathy. SPINE/MUSCULOSKELETAL EXAM      Lumbar spine:  No rash, ecchymosis, or gross obliquity. No fasciculations. No focal atrophy is noted. Very limited ROM all planes. Point tenderness midline L5-S1. Tenderness to palpation at the B/L sciatic notch. Tenderness to palpation of B/L trochanteric bursa. SI joints non-tender. Sensation grossly intact to light touch. MOTOR:     Hip Flex Quads Hamstrings Ankle DF EHL Ankle PF   Right 4/5 4/5 4/5 4/5 4/5 4/5   Left 4/5 4/5 4/5 4/5 4/5 4/5       DTRs are hypoactive biceps, triceps, brachioradialis, patella, and Achilles. Straight Leg raise negative. Ambulation without assistive device. FWB. RADIOGRAPHS  Lumbar spine xray films reviewed:  1) Mild Degenerative changes at L5-S1    Written by Bell Ceja, as dictated by Darlin Ahumada MD.    I, Dr. Darlin Ahumada MD, confirm that all documentation is accurate.       Ms. Anais Pratt may have a reminder for a \"due or due soon\" health maintenance. I have asked that she contact her primary care provider for follow-up on this health maintenance.

## 2018-03-12 NOTE — PROGRESS NOTES
Verbal order entered per Dr. Tianna Turpin as documented on blue sheet:  -physical therapy (patient refused physical therapy, stating that she did not believe she had to do physical therapy to get an MRI)  -cymbalta 30 mg, 1 tab PO Qdinner, disp 30, 0 RF

## 2018-03-12 NOTE — PATIENT INSTRUCTIONS
Back Care and Preventing Injuries: Care Instructions  Your Care Instructions    You can hurt your back doing many everyday activities: lifting a heavy box, bending down to garden, exercising at the gym, and even getting out of bed. But you can keep your back strong and healthy by doing some exercises. You also can follow a few tips for sitting, sleeping, and lifting to avoid hurting your back again. Talk to your doctor before you start an exercise program. Ask for help if you want to learn more about keeping your back healthy. Follow-up care is a key part of your treatment and safety. Be sure to make and go to all appointments, and call your doctor if you are having problems. It's also a good idea to know your test results and keep a list of the medicines you take. How can you care for yourself at home? · Stay at a healthy weight to avoid strain on your lower back. · Do not smoke. Smoking increases the risk of osteoporosis, which weakens the spine. If you need help quitting, talk to your doctor about stop-smoking programs and medicines. These can increase your chances of quitting for good. · Make sure you sleep in a position that maintains your back's normal curves and on a mattress that feels comfortable. Sleep on your side with a pillow between your knees, or sleep on your back with a pillow under your knees. These positions can reduce strain on your back. · When you get out of bed, lie on your side and bend both knees. Drop your feet over the edge of the bed as you push up with both arms. Scoot to the edge of the bed. Make sure your feet are in line with your rear end (buttocks), and then stand up. · If you must stand for a long time, put one foot on a stool, ledge, or box. Exercise to strengthen your back and other muscles  · Get at least 30 minutes of exercise on most days of the week. Walking is a good choice.  You also may want to do other activities, such as running, swimming, cycling, or playing tennis or team sports. · Stretch your back muscles. Here are few exercises to try:  Alex Huma on your back with your knees bent and your feet flat on the floor. Gently pull one bent knee to your chest. Put that foot back on the floor, and then pull the other knee to your chest. Hold for 15 to 30 seconds. Repeat 2 to 4 times. ¨ Do pelvic tilts. Lie on your back with your knees bent. Tighten your stomach muscles. Pull your belly button (navel) in and up toward your ribs. You should feel like your back is pressing to the floor and your hips and pelvis are slightly lifting off the floor. Hold for 6 seconds while breathing smoothly. · Keep your core muscles strong. The muscles of your back, belly (abdomen), and buttocks support your spine. ¨ Pull in your belly, and imagine pulling your navel toward your spine. Hold this for 6 seconds, then relax. Remember to keep breathing normally as you tense your muscles. ¨ Do curl-ups. Always do them with your knees bent. Keep your low back on the floor, and curl your shoulders toward your knees using a smooth, slow motion. Keep your arms folded across your chest. If this bothers your neck, try putting your hands behind your neck (not your head), with your elbows spread apart. ¨ Lie on your back with your knees bent and your feet flat on the floor. Tighten your belly muscles, and then push with your feet and raise your buttocks up a few inches. Hold this position 6 seconds as you continue to breathe normally, then lower yourself slowly to the floor. Repeat 8 to 12 times. ¨ If you like group exercise, try Pilates or yoga. These classes have poses that strengthen the core muscles. Protect your back when you sit  · Place a small pillow, a rolled-up towel, or a lumbar roll in the curve of your back if you need extra support. · Sit in a chair that is low enough to let you place both feet flat on the floor with both knees nearly level with your hips.  If your chair or desk is too high, use a foot rest to raise your knees. · When driving, keep your knees nearly level with your hips. Sit straight, and drive with both hands on the steering wheel. Your arms should be in a slightly bent position. · Try a kneeling chair, which helps tilt your hips forward. This takes pressure off your lower back. · Try sitting on an exercise ball. It can rock from side to side, which helps keep your back loose. Lift properly  · Squat down, bending at the hips and knees only. If you need to, put one knee to the floor and extend your other knee in front of you, bent at a right angle (half kneeling). · Press your chest straight forward. This helps keep your upper back straight while keeping a slight arch in your low back. · Hold the load as close to your body as possible, at the level of your navel. · Use your feet to change direction, taking small steps. · Lead with your hips as you change direction. Keep your shoulders in line with your hips as you move. Do not twist your body. · Set down your load carefully, squatting with your knees and hips only. When should you call for help? Watch closely for changes in your health, and be sure to contact your doctor if you have any problems. Where can you learn more? Go to http://gigi-staci.info/. Enter S810 in the search box to learn more about \"Back Care and Preventing Injuries: Care Instructions. \"  Current as of: March 21, 2017  Content Version: 11.4  © 4968-7598 [employer]. Care instructions adapted under license by [employer] (which disclaims liability or warranty for this information). If you have questions about a medical condition or this instruction, always ask your healthcare professional. [address] any warranty or liability for your use of this information. Back Stretches: Exercises  Your Care Instructions  Here are some examples of exercises for stretching your back. Start each exercise slowly. Ease off the exercise if you start to have pain. Your doctor or physical therapist will tell you when you can start these exercises and which ones will work best for you. How to do the exercises  Overhead stretch    1. Stand comfortably with your feet shoulder-width apart. 2. Looking straight ahead, raise both arms over your head and reach toward the ceiling. Do not allow your head to tilt back. 3. Hold for 15 to 30 seconds, then lower your arms to your sides. 4. Repeat 2 to 4 times. Side stretch    1. Stand comfortably with your feet shoulder-width apart. 2. Raise one arm over your head, and then lean to the other side. 3. Slide your hand down your leg as you let the weight of your arm gently stretch your side muscles. Hold for 15 to 30 seconds. 4. Repeat 2 to 4 times on each side. Press-up    1. Lie on your stomach, supporting your body with your forearms. 2. Press your elbows down into the floor to raise your upper back. As you do this, relax your stomach muscles and allow your back to arch without using your back muscles. As your press up, do not let your hips or pelvis come off the floor. 3. Hold for 15 to 30 seconds, then relax. 4. Repeat 2 to 4 times. Relax and rest    1. Lie on your back with a rolled towel under your neck and a pillow under your knees. Extend your arms comfortably to your sides. 2. Relax and breathe normally. 3. Remain in this position for about 10 minutes. 4. If you can, do this 2 or 3 times each day. Follow-up care is a key part of your treatment and safety. Be sure to make and go to all appointments, and call your doctor if you are having problems. It's also a good idea to know your test results and keep a list of the medicines you take. Where can you learn more? Go to http://gigi-staci.info/. Enter B267 in the search box to learn more about \"Back Stretches: Exercises. \"  Current as of: March 21, 2017  Content Version: 11.4  © 0276-0893 [employer]. Care instructions adapted under license by [employer] (which disclaims liability or warranty for this information). If you have questions about a medical condition or this instruction, always ask your healthcare professional. [address] any warranty or liability for your use of this information.

## 2018-05-24 ENCOUNTER — APPOINTMENT (OUTPATIENT)
Dept: GENERAL RADIOLOGY | Age: 38
End: 2018-05-24
Attending: EMERGENCY MEDICINE
Payer: SUBSIDIZED

## 2018-05-24 ENCOUNTER — APPOINTMENT (OUTPATIENT)
Dept: CT IMAGING | Age: 38
End: 2018-05-24
Attending: EMERGENCY MEDICINE
Payer: SUBSIDIZED

## 2018-05-24 ENCOUNTER — HOSPITAL ENCOUNTER (EMERGENCY)
Age: 38
Discharge: HOME | End: 2018-05-24
Attending: EMERGENCY MEDICINE
Payer: SUBSIDIZED

## 2018-05-24 VITALS
TEMPERATURE: 98.1 F | OXYGEN SATURATION: 100 % | SYSTOLIC BLOOD PRESSURE: 137 MMHG | WEIGHT: 215 LBS | HEART RATE: 80 BPM | DIASTOLIC BLOOD PRESSURE: 92 MMHG | BODY MASS INDEX: 38.09 KG/M2 | HEIGHT: 63 IN | RESPIRATION RATE: 16 BRPM

## 2018-05-24 DIAGNOSIS — M25.562 ARTHRALGIA OF LEFT LOWER LEG: Primary | ICD-10-CM

## 2018-05-24 DIAGNOSIS — F43.20 ADJUSTMENT DISORDER, UNSPECIFIED TYPE: ICD-10-CM

## 2018-05-24 DIAGNOSIS — R06.00 DYSPNEA, UNSPECIFIED TYPE: ICD-10-CM

## 2018-05-24 DIAGNOSIS — R91.8 PULMONARY NODULES: ICD-10-CM

## 2018-05-24 DIAGNOSIS — R03.0 ELEVATED BLOOD PRESSURE READING: ICD-10-CM

## 2018-05-24 LAB
ALBUMIN SERPL-MCNC: 3.1 G/DL (ref 3.4–5)
ALBUMIN/GLOB SERPL: 0.7 {RATIO} (ref 0.8–1.7)
ALP SERPL-CCNC: 66 U/L (ref 45–117)
ALT SERPL-CCNC: 22 U/L (ref 13–56)
ANION GAP SERPL CALC-SCNC: 8 MMOL/L (ref 3–18)
AST SERPL-CCNC: 21 U/L (ref 15–37)
ATRIAL RATE: 67 BPM
BASOPHILS # BLD: 0 K/UL (ref 0–0.06)
BASOPHILS NFR BLD: 0 % (ref 0–2)
BILIRUB SERPL-MCNC: 0.4 MG/DL (ref 0.2–1)
BUN SERPL-MCNC: 8 MG/DL (ref 7–18)
BUN/CREAT SERPL: 10 (ref 12–20)
CALCIUM SERPL-MCNC: 8.7 MG/DL (ref 8.5–10.1)
CALCULATED P AXIS, ECG09: 52 DEGREES
CALCULATED R AXIS, ECG10: 11 DEGREES
CALCULATED T AXIS, ECG11: 18 DEGREES
CHLORIDE SERPL-SCNC: 102 MMOL/L (ref 100–108)
CK MB CFR SERPL CALC: NORMAL % (ref 0–4)
CK MB SERPL-MCNC: <1 NG/ML (ref 5–25)
CK SERPL-CCNC: 138 U/L (ref 26–192)
CO2 SERPL-SCNC: 28 MMOL/L (ref 21–32)
CREAT SERPL-MCNC: 0.8 MG/DL (ref 0.6–1.3)
D DIMER PPP FEU-MCNC: 1.37 UG/ML(FEU)
DIAGNOSIS, 93000: NORMAL
DIFFERENTIAL METHOD BLD: ABNORMAL
EOSINOPHIL # BLD: 0.1 K/UL (ref 0–0.4)
EOSINOPHIL NFR BLD: 1 % (ref 0–5)
ERYTHROCYTE [DISTWIDTH] IN BLOOD BY AUTOMATED COUNT: 14.3 % (ref 11.6–14.5)
GLOBULIN SER CALC-MCNC: 4.2 G/DL (ref 2–4)
GLUCOSE SERPL-MCNC: 99 MG/DL (ref 74–99)
HCG UR QL: NEGATIVE
HCT VFR BLD AUTO: 38.6 % (ref 35–45)
HGB BLD-MCNC: 12.6 G/DL (ref 12–16)
LYMPHOCYTES # BLD: 2.7 K/UL (ref 0.9–3.6)
LYMPHOCYTES NFR BLD: 55 % (ref 21–52)
MCH RBC QN AUTO: 25.7 PG (ref 24–34)
MCHC RBC AUTO-ENTMCNC: 32.6 G/DL (ref 31–37)
MCV RBC AUTO: 78.6 FL (ref 74–97)
MONOCYTES # BLD: 0.5 K/UL (ref 0.05–1.2)
MONOCYTES NFR BLD: 10 % (ref 3–10)
NEUTS SEG # BLD: 1.7 K/UL (ref 1.8–8)
NEUTS SEG NFR BLD: 34 % (ref 40–73)
P-R INTERVAL, ECG05: 180 MS
PLATELET # BLD AUTO: 263 K/UL (ref 135–420)
PMV BLD AUTO: 10.3 FL (ref 9.2–11.8)
POTASSIUM SERPL-SCNC: 3.9 MMOL/L (ref 3.5–5.5)
PROT SERPL-MCNC: 7.3 G/DL (ref 6.4–8.2)
Q-T INTERVAL, ECG07: 404 MS
QRS DURATION, ECG06: 82 MS
QTC CALCULATION (BEZET), ECG08: 426 MS
RBC # BLD AUTO: 4.91 M/UL (ref 4.2–5.3)
SODIUM SERPL-SCNC: 138 MMOL/L (ref 136–145)
TROPONIN I SERPL-MCNC: <0.02 NG/ML (ref 0–0.06)
VENTRICULAR RATE, ECG03: 67 BPM
WBC # BLD AUTO: 5 K/UL (ref 4.6–13.2)

## 2018-05-24 PROCEDURE — 74011636320 HC RX REV CODE- 636/320: Performed by: EMERGENCY MEDICINE

## 2018-05-24 PROCEDURE — 93005 ELECTROCARDIOGRAM TRACING: CPT

## 2018-05-24 PROCEDURE — 71045 X-RAY EXAM CHEST 1 VIEW: CPT

## 2018-05-24 PROCEDURE — 85379 FIBRIN DEGRADATION QUANT: CPT | Performed by: EMERGENCY MEDICINE

## 2018-05-24 PROCEDURE — 99283 EMERGENCY DEPT VISIT LOW MDM: CPT

## 2018-05-24 PROCEDURE — 93971 EXTREMITY STUDY: CPT

## 2018-05-24 PROCEDURE — 80053 COMPREHEN METABOLIC PANEL: CPT | Performed by: EMERGENCY MEDICINE

## 2018-05-24 PROCEDURE — 81025 URINE PREGNANCY TEST: CPT | Performed by: EMERGENCY MEDICINE

## 2018-05-24 PROCEDURE — 82550 ASSAY OF CK (CPK): CPT | Performed by: EMERGENCY MEDICINE

## 2018-05-24 PROCEDURE — 71275 CT ANGIOGRAPHY CHEST: CPT

## 2018-05-24 PROCEDURE — 85025 COMPLETE CBC W/AUTO DIFF WBC: CPT | Performed by: EMERGENCY MEDICINE

## 2018-05-24 RX ORDER — ACETAMINOPHEN 325 MG/1
650 TABLET ORAL
Qty: 20 TAB | Refills: 0 | Status: SHIPPED | OUTPATIENT
Start: 2018-05-24 | End: 2018-06-21

## 2018-05-24 RX ORDER — CYCLOBENZAPRINE HCL 10 MG
10 TABLET ORAL
Qty: 12 TAB | Refills: 0 | Status: SHIPPED | OUTPATIENT
Start: 2018-05-24 | End: 2018-06-21

## 2018-05-24 RX ORDER — CYCLOBENZAPRINE HCL 10 MG
10 TABLET ORAL
Qty: 12 TAB | Refills: 0 | Status: SHIPPED | OUTPATIENT
Start: 2018-05-24 | End: 2018-05-24

## 2018-05-24 RX ADMIN — IOPAMIDOL 80 ML: 755 INJECTION, SOLUTION INTRAVENOUS at 10:51

## 2018-05-24 NOTE — ED PROVIDER NOTES
EMERGENCY DEPARTMENT HISTORY AND PHYSICAL EXAM    9:27 AM      Date: 5/24/2018  Patient Name: Kaitlyn Silva    History of Presenting Illness     Chief Complaint   Patient presents with    Leg Pain         History Provided By: Patient    Chief Complaint: Leg Pain  Duration:  Days  Timing:  Gradual and Waxing and Waning  Location: Right calf  Quality: Aching  Severity: Severe  Modifying Factors: Made worse with ambulation, mild improvement with rest.  Associated Symptoms: intermittent chest pain      Additional History (Context): Kaitlyn Silva is a 30 y.o. female with a history of GERD, anemia, and diabetes, who presents to the ED with complaint of right calf pain which began yesterday morning. Patient states that her right calf pain is made worse with ambulation and mildly improved with rest. She notes that she took Tylenol last night, without improvement in her pain. Patient also reports associated intermittent, left upper chest pain, which lasts minutes at a time. She denies worsening chest pain with ambulation. Patient denies associated shortness of breath, leg swelling, nausea, and diaphoresis. She denies recent fall, injury, or trauma which could have caused her leg pain. Patient denies recent travel or chance of pregnancy. She makes no further complaints. PCP: Nancy Montalvo MD    Current Outpatient Prescriptions   Medication Sig Dispense Refill    acetaminophen (TYLENOL) 325 mg tablet Take 2 Tabs by mouth every four (4) hours as needed for Pain. 20 Tab 0    cyclobenzaprine (FLEXERIL) 10 mg tablet Take 1 Tab by mouth three (3) times daily as needed for Muscle Spasm(s). 12 Tab 0    omega 3-dha-epa-fish oil (FISH OIL) 60- mg cap 1,000 mg.  budesonide (RHINOCORT AQUA) 32 mcg/actuation nasal spray       glucose blood VI test strips (ASCENSIA AUTODISC VI, ONE TOUCH ULTRA TEST VI) strip Test Blood sugar three times a day 30 minutes before meals.  DX E11.9  Machine name One Touch Gwynneth Sidles  cholecalciferol (VITAMIN D3) 1,000 unit tablet 1,000 Units.  cycloSPORINE modified (GENGRAF, NEORAL) 25 mg capsule       Lancets misc Test Blood sugar three times a day 30 minutes before meals. DX E11.9  Machine name One Touch [employer]      metFORMIN ER (GLUCOPHAGE XR) 500 mg tablet TAKE 2 TABS BY MOUTH ONCE A DAY.  montelukast (SINGULAIR) 10 mg tablet       ferrous sulfate 325 mg (65 mg iron) tablet 325 mg.      cyanocobalamin (VITAMIN B-12) 1,000 mcg tablet Take 1,000 mcg by mouth daily.  pantoprazole (PROTONIX) 40 mg tablet Take 40 mg by mouth daily.  DULoxetine (CYMBALTA) 30 mg capsule 1 tab PO Q dinner 30 Cap 0    alcohol swabs padm Test Blood sugar three times a day 30 minutes before meals. DX E11.9  Machine name One Touch [employer]ica      Blood-Glucose Meter monitoring kit Test Blood sugar three times a day 30 minutes before meals.  DX E11.9  Machine name One Touch [employer]         Past History     Past Medical History:  Past Medical History:   Diagnosis Date    Acid reflux     Anemia     Asthma     Depressed     Diabetes 1.5, managed as type 2 ([facility] [address]     FSGS (focal segmental glomerulosclerosis)     Gestational diabetes     Ill-defined condition     nfsg    Neuropathy     SAB (spontaneous ) 2004    Vaginal delivery     X2       Past Surgical History:  Past Surgical History:   Procedure Laterality Date    HX CHOLECYSTECTOMY      HX GYN      btl    HX HERNIA REPAIR  99    LAP UMBILICAL HERNIA REPAIR         Family History:  Family History   Problem Relation Age of Onset    Diabetes Mother     Hypertension Mother     Stroke Mother     Asthma Mother     Diabetes Father     Hypertension Father     Cancer Paternal Aunt     Heart Disease Son        Social History:  Social History   Substance Use Topics    Smoking status: Former Smoker     Packs/day: 1.00     Years: 10.00     Types: Cigarettes     Quit date: 2017    Smokeless tobacco: Never Used    Alcohol use 3.6 oz/week     3 Glasses of wine, 3 Cans of beer per week      Comment: PER MONTH       Allergies:  No Known Allergies      Review of Systems       Review of Systems   Constitutional: Negative. Negative for chills, diaphoresis and fever. HENT: Negative. Eyes: Negative. Respiratory: Negative. Negative for cough and shortness of breath. Cardiovascular: Positive for chest pain (intermittent, left upper). Negative for leg swelling. Gastrointestinal: Negative. Negative for nausea and vomiting. Endocrine: Negative. Genitourinary: Negative. Musculoskeletal: Positive for myalgias (right calf). Skin: Negative. Allergic/Immunologic: Negative. Neurological: Negative. Hematological: Negative. Psychiatric/Behavioral: Negative. All other systems reviewed and are negative. Physical Exam     Visit Vitals    BP (!) 137/92 (BP 1 Location: Left arm)    Pulse 80    Temp 98.1 °F (36.7 °C)    Resp 16    Ht 5' 3\" (1.6 m)    Wt 97.5 kg (215 lb)    SpO2 100%    BMI 38.09 kg/m2         Physical Exam   Constitutional: She is oriented to person, place, and time. She appears well-developed and well-nourished. No distress. HENT:   Head: Normocephalic and atraumatic. Eyes: Pupils are equal, round, and reactive to light. Right eye exhibits no discharge. Left eye exhibits no discharge. No scleral icterus. Neck: Normal range of motion. Neck supple. No JVD present. Cardiovascular: Normal rate and regular rhythm. Pulmonary/Chest: Effort normal and breath sounds normal. No respiratory distress. She has no wheezes. She has no rales. She exhibits no tenderness. Abdominal: Soft. There is no tenderness. Musculoskeletal: Normal range of motion. She exhibits no edema or tenderness. Neurological: She is alert and oriented to person, place, and time. Skin: Skin is warm and dry. No rash noted. She is not diaphoretic. No erythema. Nursing note and vitals reviewed.         Diagnostic Study Results     Labs -  Recent Results (from the past 12 hour(s))   EKG, 12 LEAD, INITIAL    Collection Time: 05/24/18  9:31 AM   Result Value Ref Range    Ventricular Rate 67 BPM    Atrial Rate 67 BPM    P-R Interval 180 ms    QRS Duration 82 ms    Q-T Interval 404 ms    QTC Calculation (Bezet) 426 ms    Calculated P Axis 52 degrees    Calculated R Axis 11 degrees    Calculated T Axis 18 degrees    Diagnosis       Normal sinus rhythm  Normal ECG  When compared with ECG of 20-OCT-2016 12:35,  No significant change was found     CBC WITH AUTOMATED DIFF    Collection Time: 05/24/18  9:45 AM   Result Value Ref Range    WBC 5.0 4.6 - 13.2 K/uL    RBC 4.91 4.20 - 5.30 M/uL    HGB 12.6 12.0 - 16.0 g/dL    HCT 38.6 35.0 - 45.0 %    MCV 78.6 74.0 - 97.0 FL    MCH 25.7 24.0 - 34.0 PG    MCHC 32.6 31.0 - 37.0 g/dL    RDW 14.3 11.6 - 14.5 %    PLATELET 246 253 - 948 K/uL    MPV 10.3 9.2 - 11.8 FL    NEUTROPHILS 34 (L) 40 - 73 %    LYMPHOCYTES 55 (H) 21 - 52 %    MONOCYTES 10 3 - 10 %    EOSINOPHILS 1 0 - 5 %    BASOPHILS 0 0 - 2 %    ABS. NEUTROPHILS 1.7 (L) 1.8 - 8.0 K/UL    ABS. LYMPHOCYTES 2.7 0.9 - 3.6 K/UL    ABS. MONOCYTES 0.5 0.05 - 1.2 K/UL    ABS. EOSINOPHILS 0.1 0.0 - 0.4 K/UL    ABS. BASOPHILS 0.0 0.0 - 0.06 K/UL    DF AUTOMATED     METABOLIC PANEL, COMPREHENSIVE    Collection Time: 05/24/18  9:45 AM   Result Value Ref Range    Sodium 138 136 - 145 mmol/L    Potassium 3.9 3.5 - 5.5 mmol/L    Chloride 102 100 - 108 mmol/L    CO2 28 21 - 32 mmol/L    Anion gap 8 3.0 - 18 mmol/L    Glucose 99 74 - 99 mg/dL    BUN 8 7.0 - 18 MG/DL    Creatinine 0.80 0.6 - 1.3 MG/DL    BUN/Creatinine ratio 10 (L) 12 - 20      GFR est AA >60 >60 ml/min/1.73m2    GFR est non-AA >60 >60 ml/min/1.73m2    Calcium 8.7 8.5 - 10.1 MG/DL    Bilirubin, total 0.4 0.2 - 1.0 MG/DL    ALT (SGPT) 22 13 - 56 U/L    AST (SGOT) 21 15 - 37 U/L    Alk.  phosphatase 66 45 - 117 U/L    Protein, total 7.3 6.4 - 8.2 g/dL    Albumin 3.1 (L) 3.4 - 5.0 g/dL Globulin 4.2 (H) 2.0 - 4.0 g/dL    A-G Ratio 0.7 (L) 0.8 - 1.7     CARDIAC PANEL,(CK, CKMB & TROPONIN)    Collection Time: 05/24/18  9:45 AM   Result Value Ref Range     26 - 192 U/L    CK - MB <1.0 <3.6 ng/ml    CK-MB Index  0.0 - 4.0 %     CALCULATION NOT PERFORMED WHEN RESULT IS BELOW LINEAR LIMIT    Troponin-I, Qt. <0.02 0.00 - 0.06 NG/ML   D DIMER    Collection Time: 05/24/18  9:45 AM   Result Value Ref Range    D DIMER 1.37 (H) <0.46 ug/ml(FEU)   HCG URINE, QL    Collection Time: 05/24/18 11:05 AM   Result Value Ref Range    HCG urine, QL NEGATIVE  NEG         Radiologic Studies -   DUPLEX LOWER EXT VENOUS LEFT         CTA CHEST W OR W WO CONT   Final Result      XR CHEST PORT   Final Result      Radiologist's interpretation of Chest X-Ray (Read by Dr. Aroldo Villa):  Negative chest.      Medical Decision Making   I am the first provider for this patient. I reviewed the vital signs, available nursing notes, past medical history, past surgical history, family history and social history. Vital Signs-Reviewed the patient's vital signs. EKG: Interpreted by the EP. Time Interpreted: 9:33 AM   Rate: 67 bpm   Rhythm: Normal Sinus Rhythm   Interpretation: Normal EKG, No STEMI    Records Reviewed: Nursing Notes (Time of Review: 9:27 AM)    Provider Notes (Medical Decision Making):   Patient has chest pain and leg pain. D-dimer elevated. Will obtain CTA and Doppler. Differential Diagnosis:   ACS, PE, DVT, pneumothorax      Diagnosis     Clinical Impression:   1. Arthralgia of left lower leg    2. Dyspnea, unspecified type    3. Adjustment disorder, unspecified type    4. Elevated blood pressure reading    5. Pulmonary nodules        Disposition:   10:30 AM : Pt care transferred to Dr. Adalgisa Oleary, ED provider. History of patient complaint(s), available diagnostic reports and current treatment plan has been discussed thoroughly. Bedside rounding on patient occured : yes .   Intended disposition of patient : TBD  Pending diagnostics reports and/or labs (please list): CTA Chest, LLE Duplex    _______________________________    Scribe Attestation:     Erline Jeans, acting as a scribe for and in the presence of Shawn Pinto MD      May 24, 2018 at 9:27 AM       Provider Attestation:      I personally performed the services described in the documentation, reviewed the documentation, as recorded by the scribe in my presence, and it accurately and completely records my words and actions.  May 24, 2018 at 9:27 AM - Shawn Pinto MD      _______________________________

## 2018-05-24 NOTE — ED TRIAGE NOTES
Patient c/o leg pain x 1 day denies injury. No redness no swelling patient walked in.  Patient is also c/o chest pain that started last night after the leg pain patient rates pain 7/10 for both leg and chest.

## 2018-05-24 NOTE — DISCHARGE INSTRUCTIONS
HOLD YOUR METFORMIN FOR THE NEXT 48 HOURS. Musculoskeletal Pain: Care Instructions  Your Care Instructions  Different problems with the bones, muscles, nerves, ligaments, and tendons in the body can cause pain. One or more areas of your body may ache or burn. Or they may feel tired, stiff, or sore. The medical term for this type of pain is musculoskeletal pain. It can have many different causes. Sometimes the pain is caused by an injury such as a strain or sprain. Or you might have pain from using one part of your body in the same way over and over again. This is called overuse. In some cases, the cause of the pain is another health problem such as arthritis or fibromyalgia. The doctor will examine you and ask you questions about your health to help find the cause of your pain. Blood tests or imaging tests like an X-ray may also be helpful. But sometimes doctors can't find a cause of the pain. Treatment depends on your symptoms and the cause of the pain, if known. The doctor has checked you carefully, but problems can develop later. If you notice any problems or new symptoms, get medical treatment right away. Follow-up care is a key part of your treatment and safety. Be sure to make and go to all appointments, and call your doctor if you are having problems. It's also a good idea to know your test results and keep a list of the medicines you take. How can you care for yourself at home? · Rest until you feel better. · Do not do anything that makes the pain worse. Return to exercise gradually if you feel better and your doctor says it's okay. · Be safe with medicines. Read and follow all instructions on the label. ¨ If the doctor gave you a prescription medicine for pain, take it as prescribed. ¨ If you are not taking a prescription pain medicine, ask your doctor if you can take an over-the-counter medicine. · Put ice or a cold pack on the area for 10 to 20 minutes at a time to ease pain.  Put a thin cloth between the ice and your skin. When should you call for help? Call your doctor now or seek immediate medical care if:  · You have new pain, or your pain gets worse. · You have new symptoms such as a fever, a rash, or chills. Watch closely for changes in your health, and be sure to contact your doctor if:  · You do not get better as expected. Where can you learn more? Go to [employer].be  Enter Q624 in the search box to learn more about \"Musculoskeletal Pain: Care Instructions. \"   © 0180-2995 Healthwise, Incorporated. Care instructions adapted under license by Kulwant Macedo (which disclaims liability or warranty for this information). This care instruction is for use with your licensed healthcare professional. If you have questions about a medical condition or this instruction, always ask your healthcare professional. [address] any warranty or liability for your use of this information. Content Version: 39.7.165018; Current as of: November 20, 2015             Shortness of Breath: Care Instructions  Your Care Instructions  Shortness of breath has many causes. Sometimes conditions such as anxiety can lead to shortness of breath. Some people get mild shortness of breath when they exercise. Trouble breathing also can be a symptom of a serious problem, such as asthma, lung disease, emphysema, heart problems, and pneumonia. If your shortness of breath continues, you may need tests and treatment. Watch for any changes in your breathing and other symptoms. Follow-up care is a key part of your treatment and safety. Be sure to make and go to all appointments, and call your doctor if you are having problems. It's also a good idea to know your test results and keep a list of the medicines you take. How can you care for yourself at home? · Do not smoke or allow others to smoke around you.  If you need help quitting, talk to your doctor about stop-smoking programs and medicines. These can increase your chances of quitting for good. · Get plenty of rest and sleep. · Take your medicines exactly as prescribed. Call your doctor if you think you are having a problem with your medicine. · Find healthy ways to deal with stress. ¨ Exercise daily. ¨ Get plenty of sleep. ¨ Eat regularly and well. When should you call for help? Call 911 anytime you think you may need emergency care. For example, call if:  ? · You have severe shortness of breath. ? · You have symptoms of a heart attack. These may include:  ¨ Chest pain or pressure, or a strange feeling in the chest.  ¨ Sweating. ¨ Shortness of breath. ¨ Nausea or vomiting. ¨ Pain, pressure, or a strange feeling in the back, neck, jaw, or upper belly or in one or both shoulders or arms. ¨ Lightheadedness or sudden weakness. ¨ A fast or irregular heartbeat. After you call 911, the  may tell you to chew 1 adult-strength or 2 to 4 low-dose aspirin. Wait for an ambulance. Do not try to drive yourself. ?Call your doctor now or seek immediate medical care if:  ? · Your shortness of breath gets worse or you start to wheeze. Wheezing is a high-pitched sound when you breathe. ? · You wake up at night out of breath or have to prop your head up on several pillows to breathe. ? · You are short of breath after only light activity or while at rest.   ? Watch closely for changes in your health, and be sure to contact your doctor if:  ? · You do not get better over the next 1 to 2 days. Where can you learn more? Go to http://gigi-staci.info/. Enter S780 in the search box to learn more about \"Shortness of Breath: Care Instructions. \"  Current as of: May 12, 2017  Content Version: 11.4  © 0136-9296 [employer]. Care instructions adapted under license by [employer] (which disclaims liability or warranty for this information).  If you have questions about a medical condition or this instruction, always ask your healthcare professional. [place] [address] any warranty or liability for your use of this information.

## 2018-05-24 NOTE — ED NOTES
10:41 AM: Patient care assumed from Dr. Emelia Diamond, ED provider. Patient complaint(s), current treatment plan, progression, and available diagnostic results have been discussed thoroughly. Rounding occurred: yes  Intended Disposition: TBD   Pending diagnostic reports and/or labs (please list): CTA Chest, LLE Duplex    Labs:  Recent Results (from the past 12 hour(s))   EKG, 12 LEAD, INITIAL    Collection Time: 05/24/18  9:31 AM   Result Value Ref Range    Ventricular Rate 67 BPM    Atrial Rate 67 BPM    P-R Interval 180 ms    QRS Duration 82 ms    Q-T Interval 404 ms    QTC Calculation (Bezet) 426 ms    Calculated P Axis 52 degrees    Calculated R Axis 11 degrees    Calculated T Axis 18 degrees    Diagnosis       Normal sinus rhythm  Normal ECG  When compared with ECG of 20-OCT-2016 12:35,  No significant change was found     CBC WITH AUTOMATED DIFF    Collection Time: 05/24/18  9:45 AM   Result Value Ref Range    WBC 5.0 4.6 - 13.2 K/uL    RBC 4.91 4.20 - 5.30 M/uL    HGB 12.6 12.0 - 16.0 g/dL    HCT 38.6 35.0 - 45.0 %    MCV 78.6 74.0 - 97.0 FL    MCH 25.7 24.0 - 34.0 PG    MCHC 32.6 31.0 - 37.0 g/dL    RDW 14.3 11.6 - 14.5 %    PLATELET 707 131 - 723 K/uL    MPV 10.3 9.2 - 11.8 FL    NEUTROPHILS 34 (L) 40 - 73 %    LYMPHOCYTES 55 (H) 21 - 52 %    MONOCYTES 10 3 - 10 %    EOSINOPHILS 1 0 - 5 %    BASOPHILS 0 0 - 2 %    ABS. NEUTROPHILS 1.7 (L) 1.8 - 8.0 K/UL    ABS. LYMPHOCYTES 2.7 0.9 - 3.6 K/UL    ABS. MONOCYTES 0.5 0.05 - 1.2 K/UL    ABS. EOSINOPHILS 0.1 0.0 - 0.4 K/UL    ABS.  BASOPHILS 0.0 0.0 - 0.06 K/UL    DF AUTOMATED     METABOLIC PANEL, COMPREHENSIVE    Collection Time: 05/24/18  9:45 AM   Result Value Ref Range    Sodium 138 136 - 145 mmol/L    Potassium 3.9 3.5 - 5.5 mmol/L    Chloride 102 100 - 108 mmol/L    CO2 28 21 - 32 mmol/L    Anion gap 8 3.0 - 18 mmol/L    Glucose 99 74 - 99 mg/dL    BUN 8 7.0 - 18 MG/DL    Creatinine 0.80 0.6 - 1.3 MG/DL    BUN/Creatinine ratio 10 (L) 12 - 20      GFR est AA >60 >60 ml/min/1.73m2    GFR est non-AA >60 >60 ml/min/1.73m2    Calcium 8.7 8.5 - 10.1 MG/DL    Bilirubin, total 0.4 0.2 - 1.0 MG/DL    ALT (SGPT) 22 13 - 56 U/L    AST (SGOT) 21 15 - 37 U/L    Alk. phosphatase 66 45 - 117 U/L    Protein, total 7.3 6.4 - 8.2 g/dL    Albumin 3.1 (L) 3.4 - 5.0 g/dL    Globulin 4.2 (H) 2.0 - 4.0 g/dL    A-G Ratio 0.7 (L) 0.8 - 1.7     CARDIAC PANEL,(CK, CKMB & TROPONIN)    Collection Time: 05/24/18  9:45 AM   Result Value Ref Range     26 - 192 U/L    CK - MB <1.0 <3.6 ng/ml    CK-MB Index  0.0 - 4.0 %     CALCULATION NOT PERFORMED WHEN RESULT IS BELOW LINEAR LIMIT    Troponin-I, Qt. <0.02 0.00 - 0.06 NG/ML   D DIMER    Collection Time: 05/24/18  9:45 AM   Result Value Ref Range    D DIMER 1.37 (H) <0.46 ug/ml(FEU)   HCG URINE, QL    Collection Time: 05/24/18 11:05 AM   Result Value Ref Range    HCG urine, QL NEGATIVE  NEG         Imaging:  DUPLEX LOWER EXT VENOUS LEFT         CTA CHEST W OR W WO CONT   Final Result      XR CHEST PORT   Final Result      Radiologist's interpretation of CTA Chest (Read by Dr. Ladarius Bird):  1. No pulmonary embolism evident. 2. 2 pulmonary nodules as detailed. Follow-up could be considered in 6-12 months  to ensure resolution or stability depending on patient's risk factors as  outlined in [place] criteria if clinically indicated. Preliminary interpretation of LLE Duplex (Read by PAZ Santos):  Left Leg:-  Deep venous thrombosis:           No  Superficial venous thrombosis:    No  Deep venous insufficiency:        Not examined  Superficial venous insufficiency: Not examined    1. Deep vein(s) visualized include the common femoral, proximal  femoral, mid femoral, distal femoral, popliteal(above knee),  popliteal(fossa), popliteal(below knee), posterior tibial and peroneal   veins. 2. No evidence of deep venous thrombosis detected in the veins  visualized. 3. No evidence of deep vein thrombosis in the contralateral common  femoral vein.   4. Superficial vein(s) visualized include the great saphenous vein. 5. No evidence of superficial thrombosis detected. Progress notes:  Evaluated patient and talked with her about how she is feeling. She has chest pain and leg pain, which is pretty nonspecific with elevated D-dimer. Discussed CTA Chest results with patient, which showed two pulmonary nodules. Discussed the need to follow-up with her PCP for re-evaluation. Patient is agreeable to plan and has good follow-up. Awaiting report from Duplex Study. Once received we will proceed with plan. Noticed that patient appears to be withdrawn. She states that she has increased stress at home and has a 15yo autistic son and has a hard time caring for him. Patient denies suicidal ideations, I asked her several times. Will work on getting her set up with therapy as an outpatient to help her work on coping. She agrees with this plan. 11:22 AM        Encounter Diagnoses     ICD-10-CM ICD-9-CM   1. Arthralgia of left lower leg M25.562 719.46   2. Dyspnea, unspecified type R06.00 786.09       Disposition: Discharge    Follow-up Information     Follow up With Details Comments Contact Info    Nabor Nolasco MD Schedule an appointment as soon as possible for a visit  [address] [phone]      [address] [facility] EMERGENCY DEPT  As needed, If symptoms worsen [address] [facility]  [phone]           Patient's Medications   Start Taking    No medications on file   Continue Taking    ALCOHOL SWABS PADM    Test Blood sugar three times a day 30 minutes before meals. DX E11.9  Machine name One Touch Delica    BLOOD-GLUCOSE METER MONITORING KIT    Test Blood sugar three times a day 30 minutes before meals. DX E11.9  Machine name One Touch Delica    BUDESONIDE (RHINOCORT AQUA) 32 MCG/ACTUATION NASAL SPRAY        CHOLECALCIFEROL (VITAMIN D3) 1,000 UNIT TABLET    1,000 Units.     CYANOCOBALAMIN (VITAMIN B-12) 1,000 MCG TABLET    Take 1,000 mcg by mouth daily. CYCLOSPORINE MODIFIED (GENGRAF, NEORAL) 25 MG CAPSULE        DULOXETINE (CYMBALTA) 30 MG CAPSULE    1 tab PO Q dinner    FERROUS SULFATE 325 MG (65 MG IRON) TABLET    325 mg. GLUCOSE BLOOD VI TEST STRIPS (ASCENSIA AUTODISC VI, ONE TOUCH ULTRA TEST VI) STRIP    Test Blood sugar three times a day 30 minutes before meals. DX E11.9  Machine name One Touch Delica    LANCETS MISC    Test Blood sugar three times a day 30 minutes before meals. DX E11.9  Machine name One Touch Delica    METFORMIN ER (GLUCOPHAGE XR) 500 MG TABLET    TAKE 2 TABS BY MOUTH ONCE A DAY. MONTELUKAST (SINGULAIR) 10 MG TABLET        OMEGA 3-DHA-EPA-FISH OIL (FISH OIL) 60- MG CAP    1,000 mg. PANTOPRAZOLE (PROTONIX) 40 MG TABLET    Take 40 mg by mouth daily. These Medications have changed    No medications on file   Stop Taking    No medications on file         Scribe Attestation:     Bill Catalan, acting as a scribe for and in the presence of Aida Adams MD      May 24, 2018 at 10:41 AM       Provider Attestation:      I personally performed the services described in the documentation, reviewed the documentation, as recorded by the scribe in my presence, and it accurately and completely records my words and actions.  May 24, 2018 at 10:41 AM - Aida Adams MD

## 2018-05-24 NOTE — PROCEDURES
[facility]  *** FINAL REPORT ***    Name: Noemi Herndon  MRN: [id number]  : 08 Sep 1980  HIS Order #: [id number]  [address] [facility] Visit #: [id number]  Date: 24 May 2018    TYPE OF TEST: Peripheral Venous Testing    REASON FOR TEST  Pain in limb, Limb swelling    Left Leg:-  Deep venous thrombosis:           No  Superficial venous thrombosis:    No  Deep venous insufficiency:        Not examined  Superficial venous insufficiency: Not examined      INTERPRETATION/FINDINGS  Duplex images were obtained using 2-D gray scale, color flow, and  spectral Doppler analysis. Left leg :  1. Deep vein(s) visualized include the common femoral, proximal  femoral, mid femoral, distal femoral, popliteal(above knee),  popliteal(fossa), popliteal(below knee), posterior tibial and peroneal   veins. 2. No evidence of deep venous thrombosis detected in the veins  visualized. 3. No evidence of deep vein thrombosis in the contralateral common  femoral vein. 4. Superficial vein(s) visualized include the great saphenous vein. 5. No evidence of superficial thrombosis detected. ADDITIONAL COMMENTS    I have personally reviewed the data relevant to the interpretation of  this  study. TECHNOLOGIST: Carlos Newell, [facility], RVT/  Signed: 2018 11:27 AM    PHYSICIAN: KHARI Coates   Signed: 2018 08:39 AM

## 2018-05-24 NOTE — Clinical Note
Hold your metformin for the next 48 hours. Follow up with your primary care physician. Follow up with your PCP for pulmonary nodule follow up in 6 months, as discussed. Follow-up with Fatmata Betancourt. Return to the emergency room with any new or worsening co nditions.

## 2018-06-21 ENCOUNTER — HOSPITAL ENCOUNTER (EMERGENCY)
Age: 38
Discharge: HOME | End: 2018-06-21
Attending: EMERGENCY MEDICINE
Payer: MEDICAID

## 2018-06-21 ENCOUNTER — APPOINTMENT (OUTPATIENT)
Dept: GENERAL RADIOLOGY | Age: 38
End: 2018-06-21
Attending: PHYSICIAN ASSISTANT
Payer: MEDICAID

## 2018-06-21 VITALS
SYSTOLIC BLOOD PRESSURE: 140 MMHG | TEMPERATURE: 98.1 F | HEART RATE: 83 BPM | RESPIRATION RATE: 16 BRPM | DIASTOLIC BLOOD PRESSURE: 91 MMHG | OXYGEN SATURATION: 98 %

## 2018-06-21 DIAGNOSIS — J06.9 ACUTE UPPER RESPIRATORY INFECTION: Primary | ICD-10-CM

## 2018-06-21 DIAGNOSIS — R03.0 ELEVATED BLOOD PRESSURE READING: ICD-10-CM

## 2018-06-21 PROCEDURE — 71046 X-RAY EXAM CHEST 2 VIEWS: CPT

## 2018-06-21 PROCEDURE — 99282 EMERGENCY DEPT VISIT SF MDM: CPT

## 2018-06-21 RX ORDER — BENZONATATE 100 MG/1
100 CAPSULE ORAL
Qty: 21 CAP | Refills: 0 | Status: SHIPPED | OUTPATIENT
Start: 2018-06-21 | End: 2018-06-28

## 2018-06-21 NOTE — DISCHARGE INSTRUCTIONS
Elevated Blood Pressure: Care Instructions  Your Care Instructions    Blood pressure is a measure of how hard the blood pushes against the walls of your arteries. It's normal for blood pressure to go up and down throughout the day. But if it stays up over time, you have high blood pressure. Two numbers tell you your blood pressure. The first number is the systolic pressure. It shows how hard the blood pushes when your heart is pumping. The second number is the diastolic pressure. It shows how hard the blood pushes between heartbeats, when your heart is relaxed and filling with blood. An ideal blood pressure in adults is less than 120/80 (say \"120 over 80\"). High blood pressure is 140/90 or higher. You have high blood pressure if your top number is 140 or higher or your bottom number is 90 or higher, or both. The main test for high blood pressure is simple, fast, and painless. To diagnose high blood pressure, your doctor will test your blood pressure at different times. After testing your blood pressure, your doctor may ask you to test it again when you are home. If you are diagnosed with high blood pressure, you can work with your doctor to make a long-term plan to manage it. Follow-up care is a key part of your treatment and safety. Be sure to make and go to all appointments, and call your doctor if you are having problems. It's also a good idea to know your test results and keep a list of the medicines you take. How can you care for yourself at home? · Do not smoke. Smoking increases your risk for heart attack and stroke. If you need help quitting, talk to your doctor about stop-smoking programs and medicines. These can increase your chances of quitting for good. · Stay at a healthy weight. · Try to limit how much sodium you eat to less than 2,300 milligrams (mg) a day. Your doctor may ask you to try to eat less than 1,500 mg a day. · Be physically active.  Get at least 30 minutes of exercise on most days of the week. Walking is a good choice. You also may want to do other activities, such as running, swimming, cycling, or playing tennis or team sports. · Avoid or limit alcohol. Talk to your doctor about whether you can drink any alcohol. · Eat plenty of fruits, vegetables, and low-fat dairy products. Eat less saturated and total fats. · Learn how to check your blood pressure at home. When should you call for help? Call your doctor now or seek immediate medical care if:  ? · Your blood pressure is much higher than normal (such as 180/110 or higher). ? · You think high blood pressure is causing symptoms such as:  ¨ Severe headache. ¨ Blurry vision. ? Watch closely for changes in your health, and be sure to contact your doctor if:  ? · You do not get better as expected. Where can you learn more? Go to http://gigiRussian Towersstaci.info/. Enter D951 in the search box to learn more about \"Elevated Blood Pressure: Care Instructions. \"  Current as of: September 21, 2016  Content Version: 11.4  © 7280-1079 [employer]. Care instructions adapted under license by [employer] (which disclaims liability or warranty for this information). If you have questions about a medical condition or this instruction, always ask your healthcare professional. [address] any warranty or liability for your use of this information. Upper Respiratory Infection (Cold): Care Instructions  Your Care Instructions    An upper respiratory infection, or URI, is an infection of the nose, sinuses, or throat. URIs are spread by coughs, sneezes, and direct contact. The common cold is the most frequent kind of URI. The flu and sinus infections are other kinds of URIs. Almost all URIs are caused by viruses. Antibiotics won't cure them. But you can treat most infections with home care. This may include drinking lots of fluids and taking over-the-counter pain medicine.  You will probably feel better in 4 to 10 days. The doctor has checked you carefully, but problems can develop later. If you notice any problems or new symptoms, get medical treatment right away. Follow-up care is a key part of your treatment and safety. Be sure to make and go to all appointments, and call your doctor if you are having problems. It's also a good idea to know your test results and keep a list of the medicines you take. How can you care for yourself at home? · To prevent dehydration, drink plenty of fluids, enough so that your urine is light yellow or clear like water. Choose water and other caffeine-free clear liquids until you feel better. If you have kidney, heart, or liver disease and have to limit fluids, talk with your doctor before you increase the amount of fluids you drink. · Take an over-the-counter pain medicine, such as acetaminophen (Tylenol), ibuprofen (Advil, Motrin), or naproxen (Aleve). Read and follow all instructions on the label. · Before you use cough and cold medicines, check the label. These medicines may not be safe for young children or for people with certain health problems. · Be careful when taking over-the-counter cold or flu medicines and Tylenol at the same time. Many of these medicines have acetaminophen, which is Tylenol. Read the labels to make sure that you are not taking more than the recommended dose. Too much acetaminophen (Tylenol) can be harmful. · Get plenty of rest.  · Do not smoke or allow others to smoke around you. If you need help quitting, talk to your doctor about stop-smoking programs and medicines. These can increase your chances of quitting for good. When should you call for help? Call 911 anytime you think you may need emergency care. For example, call if:  ? · You have severe trouble breathing. ?Call your doctor now or seek immediate medical care if:  ? · You seem to be getting much sicker. ? · You have new or worse trouble breathing.    ? · You have a new or higher fever. ? · You have a new rash. ? Watch closely for changes in your health, and be sure to contact your doctor if:  ? · You have a new symptom, such as a sore throat, an earache, or sinus pain. ? · You cough more deeply or more often, especially if you notice more mucus or a change in the color of your mucus. ? · You do not get better as expected. Where can you learn more? Go to http://gigi-staci.info/. Enter U514 in the search box to learn more about \"Upper Respiratory Infection (Cold): Care Instructions. \"  Current as of: May 12, 2017  Content Version: 11.4  © 6027-6600 [employer] Care instructions adapted under license by [employer] (which disclaims liability or warranty for this information). If you have questions about a medical condition or this instruction, always ask your healthcare professional. [address] any warranty or liability for your use of this information. enVerid Activation    Thank you for requesting access to enVerid. Please follow the instructions below to securely access and download your online medical record. enVerid allows you to send messages to your doctor, view your test results, renew your prescriptions, schedule appointments, and more. How Do I Sign Up? 1. In your internet browser, go to [email or web address]  2. Click on the First Time User? Click Here link in the Sign In box. You will be redirect to the New Member Sign Up page. 3. Enter your enVerid Access Code exactly as it appears below. You will not need to use this code after youve completed the sign-up process. If you do not sign up before the expiration date, you must request a new code. enVerid Access Code: [email or web address]  Expires: 2018 10:52 AM (This is the date your enVerid access code will )    4.  Enter the last four digits of your Social Security Number (xxxx) and Date of Birth (mm/dd/yyyy) as indicated and click Submit. You will be taken to the next sign-up page. 5. Create a Helixbind ID. This will be your Helixbind login ID and cannot be changed, so think of one that is secure and easy to remember. 6. Create a Helixbind password. You can change your password at any time. 7. Enter your Password Reset Question and Answer. This can be used at a later time if you forget your password. 8. Enter your e-mail address. You will receive e-mail notification when new information is available in [address]. 9. Click Sign Up. You can now view and download portions of your medical record. 10. Click the Download Summary menu link to download a portable copy of your medical information. Additional Information    If you have questions, please visit the Frequently Asked Questions section of the Helixbind website at [email or web address] Remember, Helixbind is NOT to be used for urgent needs. For medical emergencies, dial 911.

## 2018-06-21 NOTE — LETTER
[address] [place] [address] [facility] EMERGENCY DEPT 
[address] [facility] [address] 
[phone] Work/School Note Date: 6/21/2018 To Whom It May concern: 
 
Wanda Tomas was seen and treated today in the emergency room by the following provider(s): 
Attending Provider: Zbigniew Delcid MD 
Physician Assistant: Sondra Ornelas. Wanda Tomas may return to work on 6/23/18 Sincerely, 
 
 
 
 
Sondra Ornelas

## 2018-06-21 NOTE — ED PROVIDER NOTES
EMERGENCY DEPARTMENT HISTORY AND PHYSICAL EXAM    11:12 AM      Date: 6/21/2018  Patient Name: Michael Norton    History of Presenting Illness     Chief Complaint   Patient presents with    Cough    Fever    Chest Congestion         History Provided By: Patient    Chief Complaint: Cough  Duration:  Days  Timing:  Acute  Location: Respiratory tract  Quality: Productive with a clear sputum  Severity: Moderate  Modifying Factors: None  Associated Symptoms:  sore throat, fever, nausea, HA, and fatigue      Additional History (Context): Michael Norton is a 30 y.o. female with a hx of diabetes who presents with complaints of a productive cough with a clear sputum that started two days ago. She notes an associated sore throat, fever, nausea, HA, and fatigue. She has been taking Tylenol and some left over Penicillin pills but her sx are persisting. Her LNMP was at the beginning of the month and she notes no concern for pregnancy. She denies vomiting, diarrhea, and any further complaints. PCP: Danielle Ferguson MD    Current Outpatient Prescriptions   Medication Sig Dispense Refill    benzonatate (TESSALON PERLES) 100 mg capsule Take 1 Cap by mouth three (3) times daily as needed for Cough for up to 7 days. 21 Cap 0    DULoxetine (CYMBALTA) 30 mg capsule 1 tab PO Q dinner 30 Cap 0    omega 3-dha-epa-fish oil (FISH OIL) 60- mg cap 1,000 mg.  budesonide (RHINOCORT AQUA) 32 mcg/actuation nasal spray       alcohol swabs padm Test Blood sugar three times a day 30 minutes before meals. DX E11.9  Machine name One Touch Delica      glucose blood VI test strips (ASCENSIA AUTODISC VI, ONE TOUCH ULTRA TEST VI) strip Test Blood sugar three times a day 30 minutes before meals. DX E11.9  Machine name One Touch Delica      Blood-Glucose Meter monitoring kit Test Blood sugar three times a day 30 minutes before meals.  DX E11.9  Machine name One Touch Delica      cholecalciferol (VITAMIN D3) 1,000 unit tablet 1,000 Units.  cycloSPORINE modified (GENGRAF, NEORAL) 25 mg capsule       Lancets misc Test Blood sugar three times a day 30 minutes before meals. DX E11.9  Machine name One Touch Delica      metFORMIN ER (GLUCOPHAGE XR) 500 mg tablet TAKE 2 TABS BY MOUTH ONCE A DAY.  montelukast (SINGULAIR) 10 mg tablet       ferrous sulfate 325 mg (65 mg iron) tablet 325 mg.      cyanocobalamin (VITAMIN B-12) 1,000 mcg tablet Take 1,000 mcg by mouth daily.  pantoprazole (PROTONIX) 40 mg tablet Take 40 mg by mouth daily. Past History     Past Medical History:  Past Medical History:   Diagnosis Date    Acid reflux     Anemia     Asthma     Depressed     Diabetes 1.5, managed as type 2 ([facility] [address]     FSGS (focal segmental glomerulosclerosis)     Gestational diabetes     Ill-defined condition     nfsg    Neuropathy     SAB (spontaneous ) 2004    Vaginal delivery     X2       Past Surgical History:  Past Surgical History:   Procedure Laterality Date    HX CHOLECYSTECTOMY      HX GYN      btl    HX HERNIA REPAIR  1221    LAP UMBILICAL HERNIA REPAIR         Family History:  Family History   Problem Relation Age of Onset    Diabetes Mother     Hypertension Mother     Stroke Mother     Asthma Mother     Diabetes Father     Hypertension Father     Cancer Paternal Aunt     Heart Disease Son        Social History:  Social History   Substance Use Topics    Smoking status: Former Smoker     Packs/day: 1.00     Years: 10.00     Types: Cigarettes     Quit date: 2017    Smokeless tobacco: Never Used    Alcohol use 3.6 oz/week     3 Glasses of wine, 3 Cans of beer per week      Comment: PER MONTH       Allergies:  No Known Allergies      Review of Systems     Review of Systems   Constitutional: Positive for fatigue and fever. Negative for chills. HENT: Positive for sore throat. Respiratory: Positive for cough. Negative for shortness of breath.     Cardiovascular: Negative for chest pain.   Gastrointestinal: Positive for nausea. Negative for abdominal pain, diarrhea and vomiting. Skin: Negative for rash. Neurological: Positive for headaches. Negative for weakness. All other systems reviewed and are negative. Physical Exam     Visit Vitals    BP (!) 140/91 (BP 1 Location: Left arm, BP Patient Position: At rest)    Pulse 83    Temp 98.1 °F (36.7 °C)    Resp 16    SpO2 98%       Physical Exam   Constitutional: She appears well-developed and well-nourished. No distress. HENT:   Head: Normocephalic and atraumatic. Right Ear: Tympanic membrane, external ear and ear canal normal.   Left Ear: Tympanic membrane, external ear and ear canal normal.   Nose: Nose normal.   Mouth/Throat: Uvula is midline and oropharynx is clear and moist. No oropharyngeal exudate. Neck: Normal range of motion. Neck supple. No nuchal rigidity    Cardiovascular: Normal rate, regular rhythm and normal heart sounds. Exam reveals no gallop and no friction rub. No murmur heard. Pulmonary/Chest: Effort normal and breath sounds normal. No stridor. No respiratory distress. She has no wheezes. She has no rales. Abdominal: Soft. There is no tenderness. Lymphadenopathy:     She has no cervical adenopathy. Neurological: She is alert. Skin: Skin is warm. No rash noted. She is not diaphoretic. Nursing note and vitals reviewed. Diagnostic Study Results     Labs -  No results found for this or any previous visit (from the past 12 hour(s)). Radiologic Studies -   XR CHEST PA LAT   Final Result   IMPRESSION:     Stable normal chest x-ray.     No acute process demonstrated. Per Dr. Meng Maynard, Radiology         Medical Decision Making   I am the first provider for this patient. I reviewed the vital signs, available nursing notes, past medical history, past surgical history, family history and social history. Vital Signs-Reviewed the patient's vital signs.     Pulse Oximetry Analysis - 98% on room air (Interpretation)WNL    Cardiac Monitor:  Rate: 83 BPM  Rhythm:  Normal Sinus Rhythm       Records Reviewed: Nursing Notes and Old Medical Records (Time of Review: 11:12 AM)    ED Course: Progress Notes, Reevaluation, and Consults:  11:52 AM Reviewed results with patient. Discussed need for close outpatient follow-up. Discussed strict return precautions, including fever, chest pain, vomiting, or any other medical concerns. Provider Notes (Medical Decision Making): 31 yo F who presents due to cough, fever, nausea x days. Afebrile, VS stable, looks well. Lungs CTAB. No signs of otitis media, strep pharyngitis. CXR without acute process. No chest pain or dyspnea. Appears consistent with viral syndrome. Stable for d/c with close outpatient follow-up. Diagnosis     Clinical Impression:   1. Acute upper respiratory infection    2. Elevated blood pressure reading        Disposition: home     Follow-up Information     Follow up With Details Comments Contact Info    [facility] EMERGENCY DEPT  If symptoms worsen [address] [facility]    Alvarado Espino MD In 2 days  [address] [address]  [phone]             Patient's Medications   Start Taking    BENZONATATE (TESSALON PERLES) 100 MG CAPSULE    Take 1 Cap by mouth three (3) times daily as needed for Cough for up to 7 days. Continue Taking    ALCOHOL SWABS PADM    Test Blood sugar three times a day 30 minutes before meals. DX E11.9  Machine name One Touch Delica    BLOOD-GLUCOSE METER MONITORING KIT    Test Blood sugar three times a day 30 minutes before meals. DX E11.9  Machine name One Touch Delica    BUDESONIDE (RHINOCORT AQUA) 32 MCG/ACTUATION NASAL SPRAY        CHOLECALCIFEROL (VITAMIN D3) 1,000 UNIT TABLET    1,000 Units. CYANOCOBALAMIN (VITAMIN B-12) 1,000 MCG TABLET    Take 1,000 mcg by mouth daily.     CYCLOSPORINE MODIFIED (GENGRAF, NEORAL) 25 MG CAPSULE        DULOXETINE (CYMBALTA) 30 MG CAPSULE    1 tab PO Q dinner    FERROUS SULFATE 325 MG (65 MG IRON) TABLET    325 mg. GLUCOSE BLOOD VI TEST STRIPS (ASCENSIA AUTODISC VI, ONE TOUCH ULTRA TEST VI) STRIP    Test Blood sugar three times a day 30 minutes before meals. DX E11.9  Machine name One Touch Delica    LANCETS MISC    Test Blood sugar three times a day 30 minutes before meals. DX E11.9  Machine name One Touch Delica    METFORMIN ER (GLUCOPHAGE XR) 500 MG TABLET    TAKE 2 TABS BY MOUTH ONCE A DAY. MONTELUKAST (SINGULAIR) 10 MG TABLET        OMEGA 3-DHA-EPA-FISH OIL (FISH OIL) 60- MG CAP    1,000 mg. PANTOPRAZOLE (PROTONIX) 40 MG TABLET    Take 40 mg by mouth daily. These Medications have changed    No medications on file   Stop Taking    ACETAMINOPHEN (TYLENOL) 325 MG TABLET    Take 2 Tabs by mouth every four (4) hours as needed for Pain. CYCLOBENZAPRINE (FLEXERIL) 10 MG TABLET    Take 1 Tab by mouth three (3) times daily as needed for Muscle Spasm(s).     _______________________________    Attestations:  Karmen Higgins acting as a scribe for and in the presence of  Maribel Blair PA-C       June 21, 2018 at 11:31 AM       Provider Attestation:      I personally performed the services described in the documentation, reviewed the documentation, as recorded by the scribe in my presence, and it accurately and completely records my words and actions.  June 21, 2018 at 11:31 AM -  Maribel Blair PA-C   _______________________________

## 2018-07-17 ENCOUNTER — OFFICE VISIT (OUTPATIENT)
Dept: FAMILY MEDICINE CLINIC | Age: 38
End: 2018-07-17

## 2018-07-18 ENCOUNTER — HOSPITAL ENCOUNTER (EMERGENCY)
Age: 38
Discharge: HOME | End: 2018-07-18
Attending: EMERGENCY MEDICINE
Payer: MEDICAID

## 2018-07-18 ENCOUNTER — APPOINTMENT (OUTPATIENT)
Dept: CT IMAGING | Age: 38
End: 2018-07-18
Attending: EMERGENCY MEDICINE
Payer: MEDICAID

## 2018-07-18 ENCOUNTER — APPOINTMENT (OUTPATIENT)
Dept: GENERAL RADIOLOGY | Age: 38
End: 2018-07-18
Attending: EMERGENCY MEDICINE
Payer: MEDICAID

## 2018-07-18 VITALS
RESPIRATION RATE: 18 BRPM | HEART RATE: 96 BPM | DIASTOLIC BLOOD PRESSURE: 77 MMHG | SYSTOLIC BLOOD PRESSURE: 138 MMHG | TEMPERATURE: 97 F | OXYGEN SATURATION: 100 %

## 2018-07-18 DIAGNOSIS — R07.9 CHEST PAIN, UNSPECIFIED TYPE: Primary | ICD-10-CM

## 2018-07-18 LAB
ALBUMIN SERPL-MCNC: 3.1 G/DL (ref 3.4–5)
ALBUMIN/GLOB SERPL: 0.7 {RATIO} (ref 0.8–1.7)
ALP SERPL-CCNC: 70 U/L (ref 45–117)
ALT SERPL-CCNC: 31 U/L (ref 13–56)
ANION GAP SERPL CALC-SCNC: 6 MMOL/L (ref 3–18)
APTT PPP: 27.7 SEC (ref 23–36.4)
AST SERPL-CCNC: 17 U/L (ref 15–37)
ATRIAL RATE: 79 BPM
BASOPHILS # BLD: 0 K/UL (ref 0–0.1)
BASOPHILS NFR BLD: 0 % (ref 0–2)
BILIRUB SERPL-MCNC: 0.2 MG/DL (ref 0.2–1)
BUN SERPL-MCNC: 9 MG/DL (ref 7–18)
BUN/CREAT SERPL: 11 (ref 12–20)
CALCIUM SERPL-MCNC: 9 MG/DL (ref 8.5–10.1)
CALCULATED P AXIS, ECG09: 38 DEGREES
CALCULATED R AXIS, ECG10: -3 DEGREES
CALCULATED T AXIS, ECG11: 20 DEGREES
CHLORIDE SERPL-SCNC: 105 MMOL/L (ref 100–108)
CO2 SERPL-SCNC: 30 MMOL/L (ref 21–32)
CREAT SERPL-MCNC: 0.82 MG/DL (ref 0.6–1.3)
D DIMER PPP FEU-MCNC: 1.38 UG/ML(FEU)
DIAGNOSIS, 93000: NORMAL
DIFFERENTIAL METHOD BLD: ABNORMAL
EOSINOPHIL # BLD: 0.1 K/UL (ref 0–0.4)
EOSINOPHIL NFR BLD: 1 % (ref 0–5)
ERYTHROCYTE [DISTWIDTH] IN BLOOD BY AUTOMATED COUNT: 15.1 % (ref 11.6–14.5)
GLOBULIN SER CALC-MCNC: 4.5 G/DL (ref 2–4)
GLUCOSE SERPL-MCNC: 104 MG/DL (ref 74–99)
HCT VFR BLD AUTO: 39.2 % (ref 35–45)
HGB BLD-MCNC: 13 G/DL (ref 12–16)
LYMPHOCYTES # BLD: 3.3 K/UL (ref 0.9–3.6)
LYMPHOCYTES NFR BLD: 50 % (ref 21–52)
MCH RBC QN AUTO: 26.2 PG (ref 24–34)
MCHC RBC AUTO-ENTMCNC: 33.2 G/DL (ref 31–37)
MCV RBC AUTO: 79 FL (ref 74–97)
MONOCYTES # BLD: 0.6 K/UL (ref 0.05–1.2)
MONOCYTES NFR BLD: 9 % (ref 3–10)
NEUTS SEG # BLD: 2.7 K/UL (ref 1.8–8)
NEUTS SEG NFR BLD: 40 % (ref 40–73)
P-R INTERVAL, ECG05: 172 MS
PLATELET # BLD AUTO: 254 K/UL (ref 135–420)
PMV BLD AUTO: 9.6 FL (ref 9.2–11.8)
POTASSIUM SERPL-SCNC: 3.6 MMOL/L (ref 3.5–5.5)
PROT SERPL-MCNC: 7.6 G/DL (ref 6.4–8.2)
Q-T INTERVAL, ECG07: 360 MS
QRS DURATION, ECG06: 80 MS
QTC CALCULATION (BEZET), ECG08: 412 MS
RBC # BLD AUTO: 4.96 M/UL (ref 4.2–5.3)
SODIUM SERPL-SCNC: 141 MMOL/L (ref 136–145)
TROPONIN I SERPL-MCNC: <0.02 NG/ML (ref 0–0.04)
VENTRICULAR RATE, ECG03: 79 BPM
WBC # BLD AUTO: 6.7 K/UL (ref 4.6–13.2)

## 2018-07-18 PROCEDURE — 85025 COMPLETE CBC W/AUTO DIFF WBC: CPT | Performed by: EMERGENCY MEDICINE

## 2018-07-18 PROCEDURE — 84484 ASSAY OF TROPONIN QUANT: CPT | Performed by: EMERGENCY MEDICINE

## 2018-07-18 PROCEDURE — 85379 FIBRIN DEGRADATION QUANT: CPT | Performed by: EMERGENCY MEDICINE

## 2018-07-18 PROCEDURE — 94640 AIRWAY INHALATION TREATMENT: CPT

## 2018-07-18 PROCEDURE — 93005 ELECTROCARDIOGRAM TRACING: CPT

## 2018-07-18 PROCEDURE — 74011250637 HC RX REV CODE- 250/637: Performed by: EMERGENCY MEDICINE

## 2018-07-18 PROCEDURE — 80053 COMPREHEN METABOLIC PANEL: CPT | Performed by: EMERGENCY MEDICINE

## 2018-07-18 PROCEDURE — 71046 X-RAY EXAM CHEST 2 VIEWS: CPT

## 2018-07-18 PROCEDURE — 99285 EMERGENCY DEPT VISIT HI MDM: CPT

## 2018-07-18 PROCEDURE — 85730 THROMBOPLASTIN TIME PARTIAL: CPT | Performed by: EMERGENCY MEDICINE

## 2018-07-18 RX ORDER — ACETAMINOPHEN AND CODEINE PHOSPHATE 120; 12 MG/5ML; MG/5ML
15 SOLUTION ORAL
Status: COMPLETED | OUTPATIENT
Start: 2018-07-18 | End: 2018-07-18

## 2018-07-18 RX ORDER — DIAZEPAM 5 MG/1
5 TABLET ORAL
Status: COMPLETED | OUTPATIENT
Start: 2018-07-18 | End: 2018-07-18

## 2018-07-18 RX ORDER — ALBUTEROL SULFATE 90 UG/1
2 AEROSOL, METERED RESPIRATORY (INHALATION)
Status: COMPLETED | OUTPATIENT
Start: 2018-07-18 | End: 2018-07-18

## 2018-07-18 RX ORDER — ACETAMINOPHEN AND CODEINE PHOSPHATE 120; 12 MG/5ML; MG/5ML
15 SOLUTION ORAL
Qty: 118 ML | Refills: 0 | Status: SHIPPED | OUTPATIENT
Start: 2018-07-18 | End: 2019-05-08

## 2018-07-18 RX ADMIN — DIAZEPAM 5 MG: 5 TABLET ORAL at 19:13

## 2018-07-18 RX ADMIN — ALBUTEROL SULFATE 2 PUFF: 90 AEROSOL, METERED RESPIRATORY (INHALATION) at 19:13

## 2018-07-18 RX ADMIN — ACETAMINOPHEN AND CODEINE PHOSPHATE 15 ML: 120; 12 SOLUTION ORAL at 19:39

## 2018-07-18 NOTE — ED PROVIDER NOTES
EMERGENCY DEPARTMENT HISTORY AND PHYSICAL EXAM    5:46 PM      Date: 7/18/2018  Patient Name: Yomi George    History of Presenting Illness     Chief Complaint   Patient presents with    Chest Pain         History Provided By: Patient    Chief Complaint: Chest pain  Duration: 3 Days  Timing:  Intermittent  Location: Chest  Severity: 7 out of 10  Modifying Factors: Exacerbated by breathing  Associated Symptoms: Nonproductive cough and SOB. Patient denies fever, chills, recent falls, rash, and any other associated symptoms or complaints. Additional History (Context): Yomi George is a 30 y.o. 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 Associated symptoms include SOB and left leg lower pain. She states that the CP and SOB is worsened by ambulating and breathing and is improved by using an Albuterol inhaler. Patient has had intermittent CP with SOB for the past few years, and she states that the pain has worsened recently. She states that she has seen a cardiologist and a pulmonologist in the past. She has had a stress test and an US of her heart as well a CT scan of her chest that showed lymph nodes on her lungs. Patient denies fever, chills, recent falls, rash, and any other associated symptoms or complaints. PCP: Reny Hale MD    Current Facility-Administered Medications   Medication Dose Route Frequency Provider Last Rate Last Dose    iopamidol (ISOVUE-370) 76 % injection  mL   mL IntraVENous RAD ONCE Facundo Tran MD         Current Outpatient Prescriptions   Medication Sig Dispense Refill    mometasone-formoterol (DULERA) 100-5 mcg/actuation HFA inhaler Take 2 Puffs by inhalation two (2) times a day. 1 Inhaler 0    acetaminophen-codeine (TYLENOL-CODEINE) 120-12 mg/5 mL solution Take 15 mL by mouth every six (6) hours as needed for Pain. Max Daily Amount: 60 mL. 118 mL 0    DULoxetine (CYMBALTA) 30 mg capsule 1 tab PO Q dinner 30 Cap 0    omega 3-dha-epa-fish oil (FISH OIL) 60- mg cap 1,000 mg.  budesonide (RHINOCORT AQUA) 32 mcg/actuation nasal spray       alcohol swabs padm Test Blood sugar three times a day 30 minutes before meals. DX E11.9  Machine name One Touch Delica      glucose blood VI test strips (ASCENSIA AUTODISC VI, ONE TOUCH ULTRA TEST VI) strip Test Blood sugar three times a day 30 minutes before meals. DX E11.9  Machine name One Touch Delica      Blood-Glucose Meter monitoring kit Test Blood sugar three times a day 30 minutes before meals. DX E11.9  Machine name One Touch Delica      cholecalciferol (VITAMIN D3) 1,000 unit tablet 1,000 Units.  cycloSPORINE modified (GENGRAF, NEORAL) 25 mg capsule       Lancets misc Test Blood sugar three times a day 30 minutes before meals. DX E11.9  Machine name One Touch Delica      metFORMIN ER (GLUCOPHAGE XR) 500 mg tablet TAKE 2 TABS BY MOUTH ONCE A DAY.  montelukast (SINGULAIR) 10 mg tablet       ferrous sulfate 325 mg (65 mg iron) tablet 325 mg.      cyanocobalamin (VITAMIN B-12) 1,000 mcg tablet Take 1,000 mcg by mouth daily.  pantoprazole (PROTONIX) 40 mg tablet Take 40 mg by mouth daily.          Past History     Past Medical History:  Past Medical History:   Diagnosis Date    Acid reflux     Anemia     Asthma     Depressed     Diabetes 1.5, managed as type 2 ([facility] [address]     FSGS (focal segmental glomerulosclerosis)     Gestational diabetes     Ill-defined condition     nfsg    Neuropathy     SAB (spontaneous ) 2004    Vaginal delivery     X2       Past Surgical History:  Past Surgical History:   Procedure Laterality Date    HX CHOLECYSTECTOMY      HX GYN      btl    HX HERNIA REPAIR      LAP UMBILICAL HERNIA REPAIR         Family History:  Family History   Problem Relation Age of Onset    Diabetes Mother     Hypertension Mother  Stroke Mother     Asthma Mother     Diabetes Father     Hypertension Father     Cancer Paternal Aunt     Heart Disease Son        Social History:  Social History   Substance Use Topics    Smoking status: Former Smoker     Packs/day: 1.00     Years: 10.00     Types: Cigarettes     Quit date: 2/4/2017    Smokeless tobacco: Never Used    Alcohol use 3.6 oz/week     3 Glasses of wine, 3 Cans of beer per week      Comment: PER MONTH       Allergies:  No Known Allergies      Review of Systems       Review of Systems   Constitutional: Positive for fatigue. Negative for chills and fever. Respiratory: Positive for cough, chest tightness and shortness of breath. Cardiovascular: Positive for chest pain. Gastrointestinal: Positive for abdominal pain (generalized) and nausea. Musculoskeletal:        Left leg lower pain   Skin: Negative for rash. All other systems reviewed and are negative. Physical Exam     Patient Vitals for the past 12 hrs:   Temp Pulse Resp BP SpO2   07/18/18 1930 - - - 130/81 100 %   07/18/18 1915 - - - 145/79 100 %   07/18/18 1900 - - - 139/84 100 %   07/18/18 1639 97 °F (36.1 °C) 96 18 (!) 153/100 99 %           Physical Exam   Constitutional: She is oriented to person, place, and time. She appears well-developed. HENT:   Head: Normocephalic and atraumatic. Eyes: Conjunctivae and EOM are normal.   Neck: Normal range of motion. Cardiovascular: Normal heart sounds. Exam reveals no gallop and no friction rub. No murmur heard. Pulmonary/Chest: Effort normal and breath sounds normal. No stridor. Abdominal: Soft. There is no tenderness. Musculoskeletal: Normal range of motion. She exhibits no tenderness. Neurological: She is alert and oriented to person, place, and time. Skin: Skin is warm and dry. She is not diaphoretic. Psychiatric: She has a normal mood and affect. Her behavior is normal.   Nursing note and vitals reviewed.         Diagnostic Study Results Labs -  Recent Results (from the past 12 hour(s))   EKG, 12 LEAD, INITIAL    Collection Time: 07/18/18  4:40 PM   Result Value Ref Range    Ventricular Rate 79 BPM    Atrial Rate 79 BPM    P-R Interval 172 ms    QRS Duration 80 ms    Q-T Interval 360 ms    QTC Calculation (Bezet) 412 ms    Calculated P Axis 38 degrees    Calculated R Axis -3 degrees    Calculated T Axis 20 degrees    Diagnosis       Normal sinus rhythm  Possible Left atrial enlargement  Left ventricular hypertrophy  Abnormal ECG  When compared with ECG of 24-MAY-2018 09:31,  No significant change was found  Confirmed by Pao Merida ([id number]) on 7/18/2018 4:53:01 PM     CBC WITH AUTOMATED DIFF    Collection Time: 07/18/18  5:02 PM   Result Value Ref Range    WBC 6.7 4.6 - 13.2 K/uL    RBC 4.96 4.20 - 5.30 M/uL    HGB 13.0 12.0 - 16.0 g/dL    HCT 39.2 35.0 - 45.0 %    MCV 79.0 74.0 - 97.0 FL    MCH 26.2 24.0 - 34.0 PG    MCHC 33.2 31.0 - 37.0 g/dL    RDW 15.1 (H) 11.6 - 14.5 %    PLATELET 514 070 - 923 K/uL    MPV 9.6 9.2 - 11.8 FL    NEUTROPHILS 40 40 - 73 %    LYMPHOCYTES 50 21 - 52 %    MONOCYTES 9 3 - 10 %    EOSINOPHILS 1 0 - 5 %    BASOPHILS 0 0 - 2 %    ABS. NEUTROPHILS 2.7 1.8 - 8.0 K/UL    ABS. LYMPHOCYTES 3.3 0.9 - 3.6 K/UL    ABS. MONOCYTES 0.6 0.05 - 1.2 K/UL    ABS. EOSINOPHILS 0.1 0.0 - 0.4 K/UL    ABS. BASOPHILS 0.0 0.0 - 0.1 K/UL    DF AUTOMATED     METABOLIC PANEL, COMPREHENSIVE    Collection Time: 07/18/18  5:02 PM   Result Value Ref Range    Sodium 141 136 - 145 mmol/L    Potassium 3.6 3.5 - 5.5 mmol/L    Chloride 105 100 - 108 mmol/L    CO2 30 21 - 32 mmol/L    Anion gap 6 3.0 - 18 mmol/L    Glucose 104 (H) 74 - 99 mg/dL    BUN 9 7.0 - 18 MG/DL    Creatinine 0.82 0.6 - 1.3 MG/DL    BUN/Creatinine ratio 11 (L) 12 - 20      GFR est AA >60 >60 ml/min/1.73m2    GFR est non-AA >60 >60 ml/min/1.73m2    Calcium 9.0 8.5 - 10.1 MG/DL    Bilirubin, total 0.2 0.2 - 1.0 MG/DL    ALT (SGPT) 31 13 - 56 U/L    AST (SGOT) 17 15 - 37 U/L    Alk. phosphatase 70 45 - 117 U/L    Protein, total 7.6 6.4 - 8.2 g/dL    Albumin 3.1 (L) 3.4 - 5.0 g/dL    Globulin 4.5 (H) 2.0 - 4.0 g/dL    A-G Ratio 0.7 (L) 0.8 - 1.7     TROPONIN I    Collection Time: 07/18/18  5:02 PM   Result Value Ref Range    Troponin-I, Qt. <0.02 0.0 - 0.045 NG/ML   PTT    Collection Time: 07/18/18  5:02 PM   Result Value Ref Range    aPTT 27.7 23.0 - 36.4 SEC   D DIMER    Collection Time: 07/18/18  5:02 PM   Result Value Ref Range    D DIMER 1.38 (H) <0.46 ug/ml(FEU)       Radiologic Studies -   XR CHEST PA LAT    (Results Pending)     No results found. Medical Decision Making     Provider Notes (Medical Decision Making): Based on my history, physical exam, and diagnostic evaluation, the patient appears to have symptoms consistent with chest pain. The physical exam was unremarkable. I do not believe that the patient's symptoms are related to myocardial ischemia. Ekg does not demonstrate acute ischemic changes. I do not believe this is a vascular catastrophe such as a dissection or an acute rupture of an AAA. Pt could have VTE given leg pain and chest pain and elevated dimer, but she declined a CTA after shared decision making regarding her risk of radiation (factors in favor of her decision- reassuring vitals, neg trop, EKG and neg CTA 2 months ago). Pt will be discharged to follow-up with their primary care physician in the next 24-48 hours. This could be something rheumatological given her hx of FSGS on cyclosporine so will refer her to rheumatology + close cards follow up. Patient was advised of our evaluation and told to return to the emergency department if symptoms change, worsen, or new symptoms develop    I am the first provider for this patient. I reviewed the vital signs, available nursing notes, past medical history, past surgical history, family history and social history. Vital Signs-Reviewed the patient's vital signs.     Pulse Oximetry Analysis -  99% on room air (normal)    Records Reviewed: Nursing Notes (Time of Review: 5:46 PM)    ED Course: Progress Notes, Reevaluation, and Consults:    Diagnosis     Clinical Impression:   1. Chest pain, unspecified type        Disposition: Home    Follow-up Information     Follow up With Details Comments [address], [place] Schedule an appointment as soon as possible for a visit For rheumatology follow up [address] [place]  [facility] [address]  [phone]      Marlene Marquez MD Schedule an appointment as soon as possible for a visit For follow up [address]  [address] [facility] 66 [phone]        Please see your [facility] heart doctor for follow up     [facility] EMERGENCY DEPT  If symptoms worsen [address]  [phone]           Patient's Medications   Start Taking    ACETAMINOPHEN-CODEINE (TYLENOL-CODEINE) 120-12 MG/5 ML SOLUTION    Take 15 mL by mouth every six (6) hours as needed for Pain. Max Daily Amount: 60 mL. MOMETASONE-FORMOTEROL (DULERA) 100-5 MCG/ACTUATION HFA INHALER    Take 2 Puffs by inhalation two (2) times a day. Continue Taking    ALCOHOL SWABS PADM    Test Blood sugar three times a day 30 minutes before meals. DX E11.9  Machine name One Touch Delica    BLOOD-GLUCOSE METER MONITORING KIT    Test Blood sugar three times a day 30 minutes before meals. DX E11.9  Machine name One Touch Delica    BUDESONIDE (RHINOCORT AQUA) 32 MCG/ACTUATION NASAL SPRAY        CHOLECALCIFEROL (VITAMIN D3) 1,000 UNIT TABLET    1,000 Units. CYANOCOBALAMIN (VITAMIN B-12) 1,000 MCG TABLET    Take 1,000 mcg by mouth daily. CYCLOSPORINE MODIFIED (GENGRAF, NEORAL) 25 MG CAPSULE        DULOXETINE (CYMBALTA) 30 MG CAPSULE    1 tab PO Q dinner    FERROUS SULFATE 325 MG (65 MG IRON) TABLET    325 mg. GLUCOSE BLOOD VI TEST STRIPS (ASCENSIA AUTODISC VI, ONE TOUCH ULTRA TEST VI) STRIP    Test Blood sugar three times a day 30 minutes before meals.  DX E11.9  Machine name One Touch Delica    LANCETS MISC    Test Blood sugar three times a day 30 minutes before meals. DX E11.9  Machine name One Touch Delica    METFORMIN ER (GLUCOPHAGE XR) 500 MG TABLET    TAKE 2 TABS BY MOUTH ONCE A DAY. MONTELUKAST (SINGULAIR) 10 MG TABLET        OMEGA 3-DHA-EPA-FISH OIL (FISH OIL) 60- MG CAP    1,000 mg. PANTOPRAZOLE (PROTONIX) 40 MG TABLET    Take 40 mg by mouth daily. These Medications have changed    No medications on file   Stop Taking    No medications on file     _______________________________    Attestations:  [address] Deejay Gonzalez acting as a scribe for and in the presence of Madison Castaneda MD      July 18, 2018 at 5:46 PM       Provider Attestation:      I personally performed the services described in the documentation, reviewed the documentation, as recorded by the scribe in my presence, and it accurately and completely records my words and actions.  July 18, 2018 at 5:46 PM - Madison Castaneda MD    _______________________________

## 2018-07-18 NOTE — Clinical Note
Your evaluation today showed chest pain. You had a high d-dimer which can help us determine if there are blood clots. However, you had a normal scan 2 months ago looking for clots, and based on our discussion you have declined the scan due to risk for ra diation and your nodules. PLEASE see the rheumatologist which can look for more rare things for your pain such as lupus or other connective tissue disease. Please follow up with a doctor as discussed. The evaluation and treatment done today requires bee t you follow up with a physician for re-evaluation. Medical problems can change over time and symptoms can get worse or new symptoms can develop over time, therefore, it is important that you follow up as we discussed. Please immediately return to the  ER if you have any concerns. Call the ER if you have any questions about what we discussed. At the [facility] Emergency Department we are genuinely concerned about your health and comfort. You may be selected to participate in a [facility] nt satisfaction survey mailed to your home. We are excited about the opportunity to learn from your experience so we may continue to improve. In striving for the very best we believe good is not good enough, but if you rate us as EXCELLENT in all boxes,  we have succeeded. We strive to provide EXCELLENT care to you and your family. We appreciate the opportunity to take care of you, and hope you do well.

## 2018-07-18 NOTE — ED TRIAGE NOTES
Patient reports chest pain for 2-3 days. Also reporting nonproductive cough. Reporting some nausea and dizziness.

## 2018-07-19 NOTE — ED NOTES
Discharge and F/U instructions given to pt. Verbalized understanding. Pt wheeled out of ED in wheelchair by Tech. Significant other called to pick pt up.

## 2018-07-19 NOTE — DISCHARGE INSTRUCTIONS
Chest Pain: Care Instructions  Your Care Instructions    There are many things that can cause chest pain. Some are not serious and will get better on their own in a few days. But some kinds of chest pain need more testing and treatment. Your doctor may have recommended a follow-up visit in the next 8 to 12 hours. If you are not getting better, you may need more tests or treatment. Even though your doctor has released you, you still need to watch for any problems. The doctor carefully checked you, but sometimes problems can develop later. If you have new symptoms or if your symptoms do not get better, get medical care right away. If you have worse or different chest pain or pressure that lasts more than 5 minutes or you passed out (lost consciousness), call 911 or seek other emergency help right away. A medical visit is only one step in your treatment. Even if you feel better, you still need to do what your doctor recommends, such as going to all suggested follow-up appointments and taking medicines exactly as directed. This will help you recover and help prevent future problems. How can you care for yourself at home? · Rest until you feel better. · Take your medicine exactly as prescribed. Call your doctor if you think you are having a problem with your medicine. · Do not drive after taking a prescription pain medicine. When should you call for help? Call 911 if:    · You passed out (lost consciousness).     · You have severe difficulty breathing.     · You have symptoms of a heart attack. These may include:  ¨ Chest pain or pressure, or a strange feeling in your chest.  ¨ Sweating. ¨ Shortness of breath. ¨ Nausea or vomiting. ¨ Pain, pressure, or a strange feeling in your back, neck, jaw, or upper belly or in one or both shoulders or arms. ¨ Lightheadedness or sudden weakness. ¨ A fast or irregular heartbeat.   After you call 911, the  may tell you to chew 1 adult-strength or 2 to 4 low-dose aspirin. Wait for an ambulance. Do not try to drive yourself.    Call your doctor today if:    · You have any trouble breathing.     · Your chest pain gets worse.     · You are dizzy or lightheaded, or you feel like you may faint.     · You are not getting better as expected.     · You are having new or different chest pain. Where can you learn more? Go to http://gigi-staci.info/. Enter A120 in the search box to learn more about \"Chest Pain: Care Instructions. \"  Current as of: November 20, 2017  Content Version: 11.7  © 1575-9405 [employer]. Care instructions adapted under license by [employer] (which disclaims liability or warranty for this information). If you have questions about a medical condition or this instruction, always ask your healthcare professional. [address] any warranty or liability for your use of this information.

## 2018-07-24 PROBLEM — E66.01 SEVERE OBESITY (BMI 35.0-39.9): Status: ACTIVE | Noted: 2018-07-24

## 2018-11-19 LAB — PAP SMEAR, EXTERNAL: NORMAL

## 2019-05-08 ENCOUNTER — HOSPITAL ENCOUNTER (EMERGENCY)
Age: 39
Discharge: HOME | End: 2019-05-08
Attending: EMERGENCY MEDICINE | Admitting: EMERGENCY MEDICINE
Payer: MEDICAID

## 2019-05-08 ENCOUNTER — APPOINTMENT (OUTPATIENT)
Dept: CT IMAGING | Age: 39
End: 2019-05-08
Attending: PHYSICIAN ASSISTANT
Payer: MEDICAID

## 2019-05-08 VITALS
WEIGHT: 229 LBS | DIASTOLIC BLOOD PRESSURE: 84 MMHG | RESPIRATION RATE: 18 BRPM | HEART RATE: 76 BPM | SYSTOLIC BLOOD PRESSURE: 143 MMHG | OXYGEN SATURATION: 100 % | TEMPERATURE: 98.1 F | BODY MASS INDEX: 38.15 KG/M2 | HEIGHT: 65 IN

## 2019-05-08 DIAGNOSIS — R10.31 ABDOMINAL PAIN, RIGHT LOWER QUADRANT: Primary | ICD-10-CM

## 2019-05-08 LAB
ALBUMIN SERPL-MCNC: 2.8 G/DL (ref 3.4–5)
ALBUMIN/GLOB SERPL: 0.7 {RATIO} (ref 0.8–1.7)
ALP SERPL-CCNC: 81 U/L (ref 45–117)
ALT SERPL-CCNC: 19 U/L (ref 13–56)
ANION GAP SERPL CALC-SCNC: 6 MMOL/L (ref 3–18)
APPEARANCE UR: CLEAR
AST SERPL-CCNC: 24 U/L (ref 15–37)
BACTERIA URNS QL MICRO: NEGATIVE /HPF
BASOPHILS # BLD: 0 K/UL (ref 0–0.1)
BASOPHILS NFR BLD: 0 % (ref 0–2)
BILIRUB SERPL-MCNC: 0.2 MG/DL (ref 0.2–1)
BILIRUB UR QL: NEGATIVE
BUN SERPL-MCNC: 17 MG/DL (ref 7–18)
BUN/CREAT SERPL: 18 (ref 12–20)
CALCIUM SERPL-MCNC: 8.4 MG/DL (ref 8.5–10.1)
CHLORIDE SERPL-SCNC: 105 MMOL/L (ref 100–108)
CO2 SERPL-SCNC: 28 MMOL/L (ref 21–32)
COLOR UR: YELLOW
CREAT SERPL-MCNC: 0.94 MG/DL (ref 0.6–1.3)
DIFFERENTIAL METHOD BLD: ABNORMAL
EOSINOPHIL # BLD: 0.1 K/UL (ref 0–0.4)
EOSINOPHIL NFR BLD: 1 % (ref 0–5)
EPITH CASTS URNS QL MICRO: NORMAL /LPF (ref 0–5)
ERYTHROCYTE [DISTWIDTH] IN BLOOD BY AUTOMATED COUNT: 15.3 % (ref 11.6–14.5)
GLOBULIN SER CALC-MCNC: 3.9 G/DL (ref 2–4)
GLUCOSE SERPL-MCNC: 86 MG/DL (ref 74–99)
GLUCOSE UR STRIP.AUTO-MCNC: NEGATIVE MG/DL
HCG UR QL: NEGATIVE
HCT VFR BLD AUTO: 37.2 % (ref 35–45)
HGB BLD-MCNC: 11.9 G/DL (ref 12–16)
HGB UR QL STRIP: NEGATIVE
KETONES UR QL STRIP.AUTO: NEGATIVE MG/DL
LEUKOCYTE ESTERASE UR QL STRIP.AUTO: NEGATIVE
LIPASE SERPL-CCNC: 104 U/L (ref 73–393)
LYMPHOCYTES # BLD: 2.8 K/UL (ref 0.9–3.6)
LYMPHOCYTES NFR BLD: 36 % (ref 21–52)
MCH RBC QN AUTO: 25.1 PG (ref 24–34)
MCHC RBC AUTO-ENTMCNC: 32 G/DL (ref 31–37)
MCV RBC AUTO: 78.3 FL (ref 74–97)
MONOCYTES # BLD: 0.7 K/UL (ref 0.05–1.2)
MONOCYTES NFR BLD: 9 % (ref 3–10)
NEUTS SEG # BLD: 4.2 K/UL (ref 1.8–8)
NEUTS SEG NFR BLD: 54 % (ref 40–73)
NITRITE UR QL STRIP.AUTO: NEGATIVE
PH UR STRIP: 5 [PH] (ref 5–8)
PLATELET # BLD AUTO: 295 K/UL (ref 135–420)
PMV BLD AUTO: 10.5 FL (ref 9.2–11.8)
POTASSIUM SERPL-SCNC: 3.9 MMOL/L (ref 3.5–5.5)
PROT SERPL-MCNC: 6.7 G/DL (ref 6.4–8.2)
PROT UR STRIP-MCNC: 300 MG/DL
RBC # BLD AUTO: 4.75 M/UL (ref 4.2–5.3)
RBC #/AREA URNS HPF: NEGATIVE /HPF (ref 0–5)
SERVICE CMNT-IMP: NORMAL
SODIUM SERPL-SCNC: 139 MMOL/L (ref 136–145)
SP GR UR REFRACTOMETRY: 1.02 (ref 1–1.03)
UROBILINOGEN UR QL STRIP.AUTO: 1 EU/DL (ref 0.2–1)
WBC # BLD AUTO: 7.7 K/UL (ref 4.6–13.2)
WBC URNS QL MICRO: NORMAL /HPF (ref 0–4)
WET PREP GENITAL: NORMAL

## 2019-05-08 PROCEDURE — 87210 SMEAR WET MOUNT SALINE/INK: CPT

## 2019-05-08 PROCEDURE — 99284 EMERGENCY DEPT VISIT MOD MDM: CPT

## 2019-05-08 PROCEDURE — 74011636320 HC RX REV CODE- 636/320: Performed by: EMERGENCY MEDICINE

## 2019-05-08 PROCEDURE — 74011250636 HC RX REV CODE- 250/636: Performed by: PHYSICIAN ASSISTANT

## 2019-05-08 PROCEDURE — 80053 COMPREHEN METABOLIC PANEL: CPT

## 2019-05-08 PROCEDURE — 83690 ASSAY OF LIPASE: CPT

## 2019-05-08 PROCEDURE — 81025 URINE PREGNANCY TEST: CPT

## 2019-05-08 PROCEDURE — 85025 COMPLETE CBC W/AUTO DIFF WBC: CPT

## 2019-05-08 PROCEDURE — 74177 CT ABD & PELVIS W/CONTRAST: CPT

## 2019-05-08 PROCEDURE — 81001 URINALYSIS AUTO W/SCOPE: CPT

## 2019-05-08 PROCEDURE — 87491 CHLMYD TRACH DNA AMP PROBE: CPT

## 2019-05-08 PROCEDURE — 96375 TX/PRO/DX INJ NEW DRUG ADDON: CPT

## 2019-05-08 PROCEDURE — 96374 THER/PROPH/DIAG INJ IV PUSH: CPT

## 2019-05-08 RX ORDER — ONDANSETRON 2 MG/ML
4 INJECTION INTRAMUSCULAR; INTRAVENOUS
Status: COMPLETED | OUTPATIENT
Start: 2019-05-08 | End: 2019-05-08

## 2019-05-08 RX ORDER — TRAMADOL HYDROCHLORIDE 50 MG/1
50 TABLET ORAL
Qty: 8 TAB | Refills: 0 | Status: SHIPPED | OUTPATIENT
Start: 2019-05-08 | End: 2019-05-11

## 2019-05-08 RX ORDER — MORPHINE SULFATE 2 MG/ML
2 INJECTION, SOLUTION INTRAMUSCULAR; INTRAVENOUS
Status: COMPLETED | OUTPATIENT
Start: 2019-05-08 | End: 2019-05-08

## 2019-05-08 RX ADMIN — IOPAMIDOL 100 ML: 612 INJECTION, SOLUTION INTRAVENOUS at 13:52

## 2019-05-08 RX ADMIN — MORPHINE SULFATE 2 MG: 2 INJECTION, SOLUTION INTRAMUSCULAR; INTRAVENOUS at 13:27

## 2019-05-08 RX ADMIN — ONDANSETRON 4 MG: 2 INJECTION INTRAMUSCULAR; INTRAVENOUS at 13:27

## 2019-05-08 NOTE — ED PROVIDER NOTES
EMERGENCY DEPARTMENT HISTORY AND PHYSICAL EXAM    Date: 5/8/2019  Patient Name: Nathanael Hardy    History of Presenting Illness     Chief Complaint   Patient presents with    Abdominal Pain    Nausea         History Provided By: Patient      Additional History (Context): Nathanael Hardy is a 43-year-old female came complaining of abdominal pain x1 day. States pain is located in right lower quadrnt and shoots across entire lower abdomen. Associated with nausea. Denies vomiting, diarrhea, fever, chills, cp, sob, urinary sx, or any other sx. Tubal ligation. Deneis vaginal discharge, bleeding or any other complaints. No etoh use. Tolerating po ok. No recent travel. No     PCP: Catherine Leonardo MD    Current Outpatient Medications   Medication Sig Dispense Refill    traMADol (ULTRAM) 50 mg tablet Take 1 Tab by mouth every four (4) hours as needed for Pain for up to 3 days. Max Daily Amount: 300 mg. 8 Tab 0    albuterol (PROVENTIL HFA, VENTOLIN HFA, PROAIR HFA) 90 mcg/actuation inhaler 2 Puffs.  mometasone-formoterol (DULERA) 100-5 mcg/actuation HFA inhaler Take 2 Puffs by inhalation two (2) times a day. 1 Inhaler 0    omega 3-dha-epa-fish oil (FISH OIL) 60- mg cap 1,000 mg.      glucose blood VI test strips (ASCENSIA AUTODISC VI, ONE TOUCH ULTRA TEST VI) strip Test Blood sugar three times a day 30 minutes before meals. DX E11.9  Machine name One Touch Delica      Blood-Glucose Meter monitoring kit Test Blood sugar three times a day 30 minutes before meals. DX E11.9  Machine name One Touch Delica      cholecalciferol (VITAMIN D3) 1,000 unit tablet 1,000 Units.  cycloSPORINE modified (GENGRAF, NEORAL) 25 mg capsule       Lancets misc Test Blood sugar three times a day 30 minutes before meals. DX E11.9  Machine name One Touch Delica      metFORMIN ER (GLUCOPHAGE XR) 500 mg tablet TAKE 2 TABS BY MOUTH ONCE A DAY.       montelukast (SINGULAIR) 10 mg tablet       pantoprazole (PROTONIX) 40 mg tablet Take 40 mg by mouth daily.  tiotropium bromide (SPIRIVA RESPIMAT) 1.25 mcg/actuation inhaler 2.5 mcg. Past History     Past Medical History:  Past Medical History:   Diagnosis Date    Acid reflux     Anemia     Asthma     Depressed     Diabetes 1.5, [facility] as type 2 ([facility] [address]     FSGS (focal segmental glomerulosclerosis)     Gestational diabetes     Ill-defined condition     nfsg    Neuropathy     SAB (spontaneous ) 2004    Vaginal delivery     X2       Past Surgical History:  Past Surgical History:   Procedure Laterality Date    HX CHOLECYSTECTOMY      HX GYN      btl    HX HERNIA REPAIR  3061    LAP UMBILICAL HERNIA REPAIR      LAP,TUBAL CAUTERY         Family History:  Family History   Problem Relation Age of Onset    Diabetes Mother     Hypertension Mother     Stroke Mother     Asthma Mother     Diabetes Father     Hypertension Father     Cancer Paternal Aunt     Heart Disease Son        Social History:  Social History     Tobacco Use    Smoking status: Former Smoker     Packs/day: 1.00     Years: 10.00     Pack years: 10.00     Types: Cigarettes     Last attempt to quit: 2017     Years since quittin.2    Smokeless tobacco: Never Used   Substance Use Topics    Alcohol use: Yes     Alcohol/week: 3.6 oz     Types: 3 Glasses of wine, 3 Cans of beer per week     Comment: PER MONTH    Drug use: No       Allergies:  No Known Allergies      Review of Systems     Review of Systems   Constitutional: Negative for chills and fever. HENT: Negative for nasal congestion, sore throat, rhinorrhea  Eyes: Negative. Respiratory: pos cough and negative for shortness of breath. Cardiovascular: Negative for chest pain and palpitations. Gastrointestinal: Positive for abdominal pain, Negative for constipation, diarrhea, + nausea and negative vomiting. Genitourinary: Negative. Negative for difficulty urinating and flank pain.    Musculoskeletal: Negative for back pain. Negative for gait problem and neck pain. Skin: Negative. Allergic/Immunologic: Negative. Neurological: Negative for dizziness, weakness, numbness and headaches. Psychiatric/Behavioral: Negative. All other systems reviewed and are negative. All Other Systems Negative  Physical Exam     Vitals:    05/08/19 1122 05/08/19 1406 05/08/19 1408 05/08/19 1430   BP: 135/87 126/82  143/84   Pulse: 76      Resp: 18      Temp: 98.1 °F (36.7 °C)      SpO2: 100%  100% 100%   Weight: 103.9 kg (229 lb)      Height: 5' 5\" (1.651 m)        Physical Exam   Constitutional: She is oriented to person, place, and time. She appears well-developed and well-nourished. No distress. NAD, well hydrated, non toxic     HENT:   Head: Normocephalic and atraumatic. Nose: Nose normal.   Mouth/Throat: Oropharynx is clear and moist. No oropharyngeal exudate. Eyes: Pupils are equal, round, and reactive to light. Conjunctivae and EOM are normal.   Neck: Normal range of motion. Neck supple. Cardiovascular: Normal rate, regular rhythm and normal heart sounds. No murmur heard. Pulmonary/Chest: Effort normal and breath sounds normal. No respiratory distress. She has no wheezes. She has no rales. Abdominal: Soft. She exhibits no distension. There is no tenderness. There is no rebound and no guarding. Genitourinary: Vagina normal. No vaginal discharge found. Musculoskeletal: Normal range of motion. Lymphadenopathy:     She has no cervical adenopathy. Neurological: She is alert and oriented to person, place, and time. No cranial nerve deficit. Coordination normal.   Skin: Skin is warm. No rash noted. She is not diaphoretic. Psychiatric: She has a normal mood and affect. Her behavior is normal.   Nursing note and vitals reviewed.         Diagnostic Study Results     Labs -     Recent Results (from the past 12 hour(s))   URINALYSIS W/ RFLX MICROSCOPIC    Collection Time: 05/08/19 11:30 AM   Result Value Ref Range    Color YELLOW      Appearance CLEAR      Specific gravity 1.023 1.005 - 1.030      pH (UA) 5.0 5.0 - 8.0      Protein 300 (A) NEG mg/dL    Glucose NEGATIVE  NEG mg/dL    Ketone NEGATIVE  NEG mg/dL    Bilirubin NEGATIVE  NEG      Blood NEGATIVE  NEG      Urobilinogen 1.0 0.2 - 1.0 EU/dL    Nitrites NEGATIVE  NEG      Leukocyte Esterase NEGATIVE  NEG     HCG URINE, QL    Collection Time: 05/08/19 11:30 AM   Result Value Ref Range    HCG urine, QL NEGATIVE  NEG     URINE MICROSCOPIC ONLY    Collection Time: 05/08/19 11:30 AM   Result Value Ref Range    WBC 0 to 3 0 - 4 /hpf    RBC NEGATIVE  0 - 5 /hpf    Epithelial cells FEW 0 - 5 /lpf    Bacteria NEGATIVE  NEG /hpf   CBC WITH AUTOMATED DIFF    Collection Time: 05/08/19 12:12 PM   Result Value Ref Range    WBC 7.7 4.6 - 13.2 K/uL    RBC 4.75 4.20 - 5.30 M/uL    HGB 11.9 (L) 12.0 - 16.0 g/dL    HCT 37.2 35.0 - 45.0 %    MCV 78.3 74.0 - 97.0 FL    MCH 25.1 24.0 - 34.0 PG    MCHC 32.0 31.0 - 37.0 g/dL    RDW 15.3 (H) 11.6 - 14.5 %    PLATELET 547 416 - 524 K/uL    MPV 10.5 9.2 - 11.8 FL    NEUTROPHILS 54 40 - 73 %    LYMPHOCYTES 36 21 - 52 %    MONOCYTES 9 3 - 10 %    EOSINOPHILS 1 0 - 5 %    BASOPHILS 0 0 - 2 %    ABS. NEUTROPHILS 4.2 1.8 - 8.0 K/UL    ABS. LYMPHOCYTES 2.8 0.9 - 3.6 K/UL    ABS. MONOCYTES 0.7 0.05 - 1.2 K/UL    ABS. EOSINOPHILS 0.1 0.0 - 0.4 K/UL    ABS.  BASOPHILS 0.0 0.0 - 0.1 K/UL    DF AUTOMATED     METABOLIC PANEL, COMPREHENSIVE    Collection Time: 05/08/19 12:12 PM   Result Value Ref Range    Sodium 139 136 - 145 mmol/L    Potassium 3.9 3.5 - 5.5 mmol/L    Chloride 105 100 - 108 mmol/L    CO2 28 21 - 32 mmol/L    Anion gap 6 3.0 - 18 mmol/L    Glucose 86 74 - 99 mg/dL    BUN 17 7.0 - 18 MG/DL    Creatinine 0.94 0.6 - 1.3 MG/DL    BUN/Creatinine ratio 18 12 - 20      GFR est AA >60 >60 ml/min/1.73m2    GFR est non-AA >60 >60 ml/min/1.73m2    Calcium 8.4 (L) 8.5 - 10.1 MG/DL    Bilirubin, total 0.2 0.2 - 1.0 MG/DL    ALT (SGPT) 19 13 - 56 U/L    AST (SGOT) 24 15 - 37 U/L    Alk. phosphatase 81 45 - 117 U/L    Protein, total 6.7 6.4 - 8.2 g/dL    Albumin 2.8 (L) 3.4 - 5.0 g/dL    Globulin 3.9 2.0 - 4.0 g/dL    A-G Ratio 0.7 (L) 0.8 - 1.7     LIPASE    Collection Time: 05/08/19 12:12 PM   Result Value Ref Range    Lipase 104 73 - 393 U/L   WET PREP    Collection Time: 05/08/19  1:56 PM   Result Value Ref Range    Special Requests: NO SPECIAL REQUESTS      Wet prep NO YEAST,TRICHOMONAS OR CLUE CELLS NOTED         Radiologic Studies -   CT ABD PELV W CONT   Final Result   IMPRESSION:   1. No acute pathology appreciated in the abdomen or pelvis. CT Results  (Last 48 hours)               05/08/19 1352  CT ABD PELV W CONT Final result    Impression:  IMPRESSION:   1. No acute pathology appreciated in the abdomen or pelvis. Narrative:  EXAM: CT Abdomen and Pelvis with IV contrast       CLINICAL INDICATION: Right lower quadrant pain       TECHNIQUE: CT of the abdomen and pelvis performed post IV contrast. Sagittal and   coronal reformations obtained. All CT scans at this facility are performed using dose optimization technique as   appropriate to a performed exam, to include automated exposure control,   adjustment of the mA and/or kV according to patient size (including appropriate   matching for site specific examination) or use of iterative reconstruction   technique. IV CONTRAST: 100 cc of Isovue 300       ENTERIC CONTRAST: None       COMPARISON: CT angiogram of the chest dated 5/24/2018       FINDINGS:    Lower Chest: No acute infiltrate or effusion appreciated. The visualized heart   and pericardium are unremarkable. Peritoneum: No free air appreciated. No free fluid identified. No fluid   collections seen. Liver: In the right lobe of the liver, there is a 4 mm hypodensity. This is   unchanged. Biliary/Gallbladder: No intrahepatic or extrahepatic biliary ductal dilatation   appreciated.  The gallbladder is surgically absent. Spleen: Unremarkable. Pancreas: No focal lesion appreciated. The pancreatic duct is unremarkable. No   peripancreatic inflammation or adenopathy. : The adrenal glands are unremarkable. No perinephric fat stranding   appreciated. There is normal enhancement of both kidneys. No   hydroureteronephrosis of either collecting system. No acute pathology in the   bladder. The uterus and adnexa are unremarkable. GI: The stomach is unremarkable. The small bowel is without evidence of   obstruction. No small bowel wall thickening. The mesentery is without   inflammation or adenopathy. The appendix is unremarkable. No pericolonic   inflammation or wall thickening appreciated. Aorta and retroperitoneum: No aortic aneurysm seen. No periaortic adenopathy   seen. No fluid collections in the retroperitoneum appreciated. Abdominal wall: Unremarkable       Musculoskeletal: Mild degenerative changes of the lumbar spine and both hips. CXR Results  (Last 48 hours)    None            Medical Decision Making   I am the first provider for this patient. I reviewed the vital signs, available nursing notes, past medical history, past surgical history, family history and social history. Vital Signs-Reviewed the patient's vital signs. Records Reviewed: Nursing notes, old medical records and any previous labs, imaging, visits, consultations pertinent to patient care    Procedures:  Pelvic Exam  Date/Time: 5/8/2019 5:33 PM  Performed by: PA  Procedure duration:  5 minutes. Documented by:  self. As dictated by:  self  Exam assisted by:  sarah Hinds. Type of exam performed: bimanual and speculum. External genitalia appearance: normal.    Vaginal exam:  normal.    Cervical exam:  normal.    Specimen(s) collected:  GC, chlamydia and vaginal culture.   Bimanual exam:  normal.    Patient tolerance: Patient tolerated the procedure well with no immediate complications          Provider Notes (Medical Decision Making):     Pt presents ambulatory in NAD, well-hydrated, non-toxic in appearance, with normal vitals. Benign exam of abdomen with mild generalized TTP and no peritoneal signs. Unremarkable labs, urine. Imaging negative for acute processes. Tolerating PO. Pt appears well, comfortable. Doubt acute surgical process. Repeat abdominal exam reveals soft and non tender abdomen. No new sx. Pt has no pain and improvement in nausea. Do not feel that any additional emergent imaging is warranted at this time. Will DC home with supportive treatment and pcp f/u. GI referral given. MED RECONCILIATION:  No current facility-administered medications for this encounter. Current Outpatient Medications   Medication Sig    traMADol (ULTRAM) 50 mg tablet Take 1 Tab by mouth every four (4) hours as needed for Pain for up to 3 days. Max Daily Amount: 300 mg.    albuterol (PROVENTIL HFA, VENTOLIN HFA, PROAIR HFA) 90 mcg/actuation inhaler 2 Puffs.  mometasone-formoterol (DULERA) 100-5 mcg/actuation HFA inhaler Take 2 Puffs by inhalation two (2) times a day.  omega 3-dha-epa-fish oil (FISH OIL) 60- mg cap 1,000 mg.    glucose blood VI test strips (ASCENSIA AUTODISC VI, ONE TOUCH ULTRA TEST VI) strip Test Blood sugar three times a day 30 minutes before meals. DX E11.9  Machine name One Touch Delica    Blood-Glucose Meter monitoring kit Test Blood sugar three times a day 30 minutes before meals. DX E11.9  Machine name One Touch Delica    cholecalciferol (VITAMIN D3) 1,000 unit tablet 1,000 Units.  cycloSPORINE modified (GENGRAF, NEORAL) 25 mg capsule     Lancets misc Test Blood sugar three times a day 30 minutes before meals. DX E11.9  Machine name One Touch Delica    metFORMIN ER (GLUCOPHAGE XR) 500 mg tablet TAKE 2 TABS BY MOUTH ONCE A DAY.  montelukast (SINGULAIR) 10 mg tablet     pantoprazole (PROTONIX) 40 mg tablet Take 40 mg by mouth daily.     tiotropium bromide (SPIRIVA RESPIMAT) 1.25 mcg/actuation inhaler 2.5 mcg. Disposition:  home    DISCHARGE NOTE:     Pt has been reexamined. Patient has no new complaints, changes, or physical findings. Care plan outlined and precautions discussed. Discussed proper way to take medications. Discussed treatment plan, return precautions, symptomatic relief, and expected time to improvement. All questions answered. Patient is stable for discharge and outpatient management. Patient is ready to go home. Follow-up Information     Follow up With Specialties Details Why Contact Info    Britney Winslow MD [facility]   [address]  [phone]      [id number] [facility] EMERGENCY DEPT Emergency Medicine   [address] [place] [id number]  [phone]          Discharge Medication List as of 5/8/2019  3:07 PM                Diagnosis     Clinical Impression:   1.  Abdominal pain, right lower quadrant

## 2019-05-08 NOTE — ED TRIAGE NOTES
Patient c/o RLQ abdominal pain with nausea that started this morning. Denies vomiting, urinary or vaginal symptoms; No concern for STD.

## 2019-05-08 NOTE — ED NOTES
Irene Rust is a 41 y.o. female that was discharged in stable condition. The patients diagnosis, condition and treatment were explained to  patient and aftercare instructions were given. The patient verbalized understanding. Patient armband removed and shredded.

## 2019-05-08 NOTE — DISCHARGE INSTRUCTIONS

## 2019-05-09 LAB
C TRACH RRNA SPEC QL NAA+PROBE: NEGATIVE
N GONORRHOEA RRNA SPEC QL NAA+PROBE: NEGATIVE
SPECIMEN SOURCE: NORMAL

## 2019-07-09 ENCOUNTER — OFFICE VISIT (OUTPATIENT)
Dept: ORTHOPEDIC SURGERY | Age: 39
End: 2019-07-09

## 2019-07-09 VITALS
TEMPERATURE: 96.9 F | BODY MASS INDEX: 38.15 KG/M2 | WEIGHT: 229 LBS | SYSTOLIC BLOOD PRESSURE: 116 MMHG | HEART RATE: 85 BPM | OXYGEN SATURATION: 94 % | HEIGHT: 65 IN | RESPIRATION RATE: 17 BRPM | DIASTOLIC BLOOD PRESSURE: 67 MMHG

## 2019-07-09 DIAGNOSIS — M76.62 ACHILLES TENDINITIS OF LEFT LOWER EXTREMITY: Primary | ICD-10-CM

## 2019-07-09 DIAGNOSIS — M76.822 POSTERIOR TIBIAL TENDINITIS OF LEFT LOWER EXTREMITY: ICD-10-CM

## 2019-07-09 DIAGNOSIS — M79.673 PAIN OF FOOT, UNSPECIFIED LATERALITY: ICD-10-CM

## 2019-07-09 DIAGNOSIS — M25.572 LEFT ANKLE PAIN, UNSPECIFIED CHRONICITY: ICD-10-CM

## 2019-07-09 RX ORDER — TRAMADOL HYDROCHLORIDE AND ACETAMINOPHEN 37.5; 325 MG/1; MG/1
1-2 TABLET ORAL
Qty: 30 TAB | Refills: 0 | Status: SHIPPED | OUTPATIENT
Start: 2019-07-09 | End: 2019-07-23

## 2019-07-09 NOTE — PROGRESS NOTES
AMBULATORY PROGRESS NOTE      Patient: Jeramy Caruso             MRN: [id number]     SSN: [id number] Body mass index is 38.11 kg/m². YOB: 1980     AGE: 41 y.o. SEX: female    PCP: Kamran Romero MD     IMPRESSION/DIAGNOSIS AND TREATMENT PLAN     DIAGNOSES  1. Achilles tendinitis of left lower extremity    2. Posterior tibial tendinitis of left lower extremity    3. Left ankle pain, unspecified chronicity    4. Pain of foot, unspecified laterality        Orders Placed This Encounter    [50058] Ankle Min 3V    [27741] Foot Min 3V      Bo WALTERS [employer] understands her diagnoses and the proposed plan. Plan:    1) DME Order: Short left CAM walker boot. 2) Ultracet 37.5 m-2 PO Q6HPRN; 30 tablets, 0 refills. 3) Continue activity modification as directed. 4) Anticipate MRI or referral to PT if condition does not improve. RTO - 3 weeks     HPI AND EXAMINATION     oB Harper IS A 41 y.o. female who presents to my outpatient office for a problem visit of the left foot. Ms. [employer] reports that last Monday, on , she was running errands while wearing sandals. She states that the next day, she was experiencing pain in her left foot, and as such, she went to the ED. The patient denies any recent falls, twists, or trauma. She did not have x-rays taken at the ED, but she was given an ACE wrap and diagnosed with Achilles tendinitis. She states that she has also been experiencing tingling when she bears weight, but she believes this may be due to not bearing much within the past week, due to the pain. She denies exposure to fluoroquinolones. The patient states that she has h/o DM and has issues with her kidneys (protein in urine). She states that she has been going to the bathroom without issues. She is not currently working. She denies any recent incidents of prolonged travel and she is not currently taking birth control.      The patient has h/o DM and focal segmental glomerulosclerosis. She takes Metformin. She cannot take NSAIDs. Visit Vitals  /67   Pulse 85   Temp 96.9 °F (36.1 °C) (Oral)   Resp 17   Ht 5' 5\" (1.651 m)   Wt 229 lb (103.9 kg)   SpO2 94%   BMI 38.11 kg/m²     Appearance: Alert, well appearing and pleasant patient who is in no distress, oriented to person, place/time, and who follows commands. This patient is accompanied in the examination room by her boyfriend. Dementia: no dementia  Psychiatric: Affect and mood are appropriate. Patient arrives to office via: without assistive device  HEENT: Head normocephalic & atraumatic. Both pupils are round, non icteric sclera   Eye: EOM are intact and sclera are clear    Neck: ROM WNL and JVD neck is not present     Hearings Intact, does not require hearing aid device  Respiratory: Breathing is unlabored without accessory chest muscle use  Cardiovascular/Peripheral Vascular: Normal Pulses to each foot    Left ACHILLES GASTROCNEMIUS COMPLEX,PLANTAR FASCIA    Gait: antalgic  Tenderness: mild to moderate Achilles tendon sheath Insertional point    Mild tenderness to the posterior tibial tendon   NO Noninsertional Achilles tendon tenderness   Calf tenderness: Absent for calf or gastrocnemius muscle regions   Soft, supple, non tender, non taut lower extremity compartments   NONE to Medial Malleolar, 4/5 Met base midfoot, achilles, tib post, or   NONE to syndesmosis. no to plantar fascia, or central calcaneal region  Deformity/Swelling:  NO  Insertional point of Distal Achilles Tendon:    NO Fusiform noninsertional focal tendinopathy   NO Ester's Deformity Present  Cutaneous: No rashes, skin patches, wounds, or abrasions to the lower legs           Warm and Normal color. No regions of expressible drainage.            Medial Border of Tibia Region: absent           Skin color, texture, turgor normal. Normal.  Joint Motion: ROM Ankle:Normal , Hindfoot: (ST,TN,CC Normal}, Forefoot toes:Normal  Neurologic Exam: Neuro: Motor: normal 5/5 strength in all tested muscle groups and Sensory : no sensory deficits noted. No abnormal hand/wrist, foot/ankle, or facial/neck tremors. Contractures: Gastrocnemius or Achilles Contractures absent. Joint / Tendon Stability:    No anterolateral or varus instability of the Ankle or Subtalar Joints              No peroneal tendon instability present with ankle circumduction  Alignment:  Normal Foot Alignment and  Flexible  Vascular: Normal Pulses/ NL Capillary refill, No evidence of DVT seen on physical exam.   No calf swelling, no tenderness to calf. Varicosities Lower Limbs :none  Lymphatic:  No Evidence of Lymphedema    CHART REVIEW     Past Medical History:   Diagnosis Date    Acid reflux     Anemia     Asthma     Depressed     Diabetes 1.5, managed as type 2 ([facility] [address]     FSGS (focal segmental glomerulosclerosis)     Gestational diabetes     Ill-defined condition     nfsg    Neuropathy     SAB (spontaneous ) 2004    Vaginal delivery     X2     Current Outpatient Medications   Medication Sig    albuterol (PROVENTIL HFA, VENTOLIN HFA, PROAIR HFA) 90 mcg/actuation inhaler 2 Puffs.  tiotropium bromide (SPIRIVA RESPIMAT) 1.25 mcg/actuation inhaler 2.5 mcg.  mometasone-formoterol (DULERA) 100-5 mcg/actuation HFA inhaler Take 2 Puffs by inhalation two (2) times a day.  omega 3-dha-epa-fish oil (FISH OIL) 60- mg cap 1,000 mg.    glucose blood VI test strips (ASCENSIA AUTODISC VI, ONE TOUCH ULTRA TEST VI) strip Test Blood sugar three times a day 30 minutes before meals. DX E11.9  Machine name One Touch Delica    Blood-Glucose Meter monitoring kit Test Blood sugar three times a day 30 minutes before meals. DX E11.9  Machine name One Touch Delica    cholecalciferol (VITAMIN D3) 1,000 unit tablet 1,000 Units.  cycloSPORINE modified (GENGRAF, NEORAL) 25 mg capsule     Lancets misc Test Blood sugar three times a day 30 minutes before meals.  DX E11.9  Machine name One Touch Delica    metFORMIN ER (GLUCOPHAGE XR) 500 mg tablet TAKE 2 TABS BY MOUTH ONCE A DAY.  montelukast (SINGULAIR) 10 mg tablet     pantoprazole (PROTONIX) 40 mg tablet Take 40 mg by mouth daily. No current facility-administered medications for this visit. No Known Allergies  Past Surgical History:   Procedure Laterality Date    HX CHOLECYSTECTOMY      HX GYN      btl    HX HERNIA REPAIR  0373    LAP UMBILICAL HERNIA REPAIR      LAP,TUBAL CAUTERY       Social History     Occupational History    Not on file   Tobacco Use    Smoking status: Former Smoker     Packs/day: 1.00     Years: 10.00     Pack years: 10.00     Types: Cigarettes     Last attempt to quit: 2017     Years since quittin.4    Smokeless tobacco: Never Used   Substance and Sexual Activity    Alcohol use: Yes     Alcohol/week: 3.6 oz     Types: 3 Glasses of wine, 3 Cans of beer per week     Comment: PER MONTH    Drug use: No    Sexual activity: Yes     Partners: Male     Birth control/protection: Surgical     Comment: BTL     Family History   Problem Relation Age of Onset    Diabetes Mother     Hypertension Mother     Stroke Mother     Asthma Mother     Diabetes Father     Hypertension Father     Cancer Paternal Aunt     Heart Disease Son         REVIEW OF SYSTEMS : 2019  ALL BELOW ARE Negative except : SEE HPI     Constitutional: Negative for fever, chills and weight loss. Neg Weight Loss  Cardiovascular: Negative for chest pain, claudication and leg swelling. SOB, SMITH   Gastrointestinal/Urological: Negative for  pain, N/V/D/C, Blood in stool or urine,dysuria                         Hematuria, Incontinence, pelvic pain  Musculoskeletal: see HPI. Neurological: Negative for dizziness and weakness, headaches,Visual Changes             Confusion,  Or Seizures,   Psychiatric/Behavioral: Negative for depression, memory loss and substance abuse.    Extremities:  Negative for hair changes, rash or skin lesion changes. Hematologic: Negative for Bleeding problems, bruising, pallor or swollen lymph nodes. Peripheral Vascular: No calf pain, vascular vein tenderness to calf pain              No calf throbbing, posterior knee throbbing pain     DIAGNOSTIC IMAGING       ANKLE X RAYS 3 VIEWS Left   X RAYS AT [address] [place] [address]  7/9/2019    FINDINGS:     SOFT TISSUES:              Absent soft tissue swelling, No soft tissue calcifications, No Calcified blood vessels     Soft tissue swelling location:    None to fibula region        moderate medial aspect of ankle    None dorsal midfoot/forefoot  No radiopaque foreign body, abnormal lucency, or focal swelling noted within soft tissues. OSSEOUS:     No fractures, subluxations, dislocations   Bone spur to inferior aspect of calcaneus is  absent   Bone spur to superior calcaneus region is           small SIZED CALCIFIC INSERTIONAL BONE SPUR IS PRESENT   Mineralization: suggests no Osteopenia or no Osteoporosis    ALIGNMENT:    Ankle mortise alignment is congruent. Tibial plafond and talar dome intact      No Osteochondral defects seen    No Ankle joint effusion seen         FOOT X RAYS 3 VIEWS Left   7/9/2019    NON WEIGHT BEARING    X RAYS AT [address] [place] [address]  7/9/2019      Bones: No fractures or dislocations. No focal osteolytic or osteoblastic process     Bone Spurs: No significant bone spurs  Alignment foot: WNL   Joint Condition: Joint Condition: No Significant OA  Soft Tissues moderate medial soft tissue swelling at TN region   No ankle joint effusion in lateral projection. Mineralization: suggests Normal Bone    I have personally reviewed the results of the above study. The interpretation of this study is my professional opinion    I have personally reviewed these images of the above study. The interpretation of this study is my professional opinion.     Carmen Ellis MD  7/9/2019  6:25 PM         Written by [facility], Dahlia Conley, as dictated by Dr. Ankit Ko. I, Dr. Ankit Ko, confirm that all documentation is accurate.

## 2019-07-09 NOTE — PROGRESS NOTES
1. Have you been to the ER, urgent care clinic since your last visit? Hospitalized since your last visit? Yes When: SATURDAY Where: [address] [place] [address] Reason for visit: LEFT ANKLE    2. Have you seen or consulted any other health care providers outside of the [address] [place] [address] since your last visit? Include any pap smears or colon screening.  No

## 2019-07-09 NOTE — PATIENT INSTRUCTIONS
Tendon Injury (Tendinopathy): Care Instructions  Your Care Instructions    Tendons are tough, flexible tissues that connect muscle to bone. A tendon can hurt or get torn from overuse or aging, especially tendons in the shoulder, elbow, wrist, hip, knee, or ankle. Tendon injuries may be called tendinopathy or tendinitis. Tendon injuries can occur from any motion you have to repeat in a job, sports, or daily activities. Tennis elbow is one common tendon injury. You can treat most tendon problems with over-the-counter pain medicine, rest, changes in your activities, and physical therapy. Follow-up care is a key part of your treatment and safety. Be sure to make and go to all appointments, and call your doctor if you are having problems. It's also a good idea to know your test results and keep a list of the medicines you take. How can you care for yourself at home? · Rest the sore area. You may have to stop doing the activity that caused the tendon pain for a while. · Take an over-the-counter pain medicine, such as acetaminophen (Tylenol), ibuprofen (Advil, Motrin), or naproxen (Aleve). Read and follow all instructions on the label. · Do not take two or more pain medicines at the same time unless the doctor told you to. Many pain medicines have acetaminophen, which is Tylenol. Too much acetaminophen (Tylenol) can be harmful. · Put ice or a cold pack on the sore area for 10 to 20 minutes at a time. Try to do this every 1 to 2 hours for the next 3 days (when you are awake) or until any swelling goes down. Put a thin cloth between the ice and your skin. · Prop up the sore area on a pillow when you ice it or anytime you sit or lie down during the next 3 days. Try to keep it above the level of your heart. This will help reduce swelling.   · Follow your doctor's advice for wearing and caring for a sling, splint, or cast. In some cases, you may wear one of these for a while to help your tendon heal.  · Follow your doctor's advice for stretching and physical therapy. Gently move your joint through its full range of motion. This will prevent stiffness in your joint. · Go back to your activity slowly. Warm up before and stretch after the activity. You also can try making some changes. For example, if a sport caused your tendon pain, alternate the sport with another activity. If using a tool causes pain, switch hands or change your . Stop the activity if it hurts. After the activity, apply ice to prevent pain and swelling. · Do not smoke. Smoking can slow healing. If you need help quitting, talk to your doctor about stop-smoking programs and medicines. These can increase your chances of quitting for good. When should you call for help? Watch closely for changes in your health, and be sure to contact your doctor if:    · Your pain gets worse.     · You do not get better as expected. Where can you learn more? Go to http://gigi-staci.info/. Enter A157 in the search box to learn more about \"Tendon Injury (Tendinopathy): Care Instructions. \"  Current as of: September 20, 2018  Content Version: 11.9  © 2178-9383 [employer]. Care instructions adapted under license by [employer] (which disclaims liability or warranty for this information). If you have questions about a medical condition or this instruction, always ask your healthcare professional. [address] any warranty or liability for your use of this information.

## 2019-07-30 ENCOUNTER — OFFICE VISIT (OUTPATIENT)
Dept: ORTHOPEDIC SURGERY | Age: 39
End: 2019-07-30

## 2019-07-30 VITALS
HEART RATE: 80 BPM | OXYGEN SATURATION: 95 % | TEMPERATURE: 98.5 F | RESPIRATION RATE: 11 BRPM | WEIGHT: 222 LBS | BODY MASS INDEX: 36.99 KG/M2 | HEIGHT: 65 IN | SYSTOLIC BLOOD PRESSURE: 135 MMHG | DIASTOLIC BLOOD PRESSURE: 88 MMHG

## 2019-07-30 RX ORDER — LANSOPRAZOLE, AMOXICILLIN, CLARITHROMYCIN 30-500-500
KIT ORAL
COMMUNITY
End: 2019-11-13 | Stop reason: ALTCHOICE

## 2019-07-30 NOTE — PROGRESS NOTES
1. Have you been to the ER, urgent care clinic since your last visit? Hospitalized since your last visit? No    2. Have you seen or consulted any other health care providers outside of the [address] [place] [address] since your last visit? Include any pap smears or colon screening.  No

## 2019-07-31 ENCOUNTER — OFFICE VISIT (OUTPATIENT)
Dept: ORTHOPEDIC SURGERY | Age: 39
End: 2019-07-31

## 2019-07-31 VITALS
RESPIRATION RATE: 16 BRPM | OXYGEN SATURATION: 94 % | SYSTOLIC BLOOD PRESSURE: 128 MMHG | HEART RATE: 79 BPM | HEIGHT: 65 IN | TEMPERATURE: 97.9 F | DIASTOLIC BLOOD PRESSURE: 84 MMHG | BODY MASS INDEX: 37.15 KG/M2 | WEIGHT: 223 LBS

## 2019-07-31 DIAGNOSIS — M76.822 POSTERIOR TIBIAL TENDINITIS OF LEFT LOWER EXTREMITY: ICD-10-CM

## 2019-07-31 DIAGNOSIS — M76.61 ACHILLES TENDONITIS, BILATERAL: Primary | ICD-10-CM

## 2019-07-31 DIAGNOSIS — M76.62 ACHILLES TENDINITIS OF LEFT LOWER EXTREMITY: ICD-10-CM

## 2019-07-31 DIAGNOSIS — M25.572 LEFT ANKLE PAIN, UNSPECIFIED CHRONICITY: ICD-10-CM

## 2019-07-31 DIAGNOSIS — M76.62 ACHILLES TENDONITIS, BILATERAL: Primary | ICD-10-CM

## 2019-07-31 RX ORDER — TRAMADOL HYDROCHLORIDE 50 MG/1
50 TABLET ORAL
Qty: 48 TAB | Refills: 0 | Status: SHIPPED | OUTPATIENT
Start: 2019-07-31 | End: 2019-08-03

## 2019-07-31 NOTE — PROGRESS NOTES
1. Have you been to the ER, urgent care clinic since your last visit? Hospitalized since your last visit? No    2. Have you seen or consulted any other health care providers outside of the [address] [place] [address] since your last visit? Include any pap smears or colon screening.  No

## 2019-07-31 NOTE — PROGRESS NOTES
AMBULATORY PROGRESS NOTE      Patient: Joe Andrews             MRN: [id number]     SSN: [id number] Body mass index is 37.11 kg/m². YOB: 1980     AGE: 41 y.o. SEX: female    PCP: Inocencia Leonard MD     IMPRESSION/DIAGNOSIS AND TREATMENT PLAN     DIAGNOSES  1. Achilles tendonitis, bilateral    2. Achilles tendinitis of left lower extremity    3. Posterior tibial tendinitis of left lower extremity    4. Left ankle pain, unspecified chronicity        Orders Placed This Encounter    [facility]    traMADol (ULTRAM) 50 mg tablet      Bo Bhandari understands her diagnoses and the proposed plan. Plan:    1) Referral to Physical therapy: eval and treat for bilateral achilles tendonitis. Include Toshia and . 2) Tramadol 50 m-2 PO BID; 48 tablets, 0 refills. RTO - 4 weeks      HPI AND EXAMINATION     Bo Harper IS A 41 y.o. female who presents to my outpatient office for a problem visit of the left foot. At last visit, she was ordered a short left CAM walker boot and prescribed ultram 37.5 mg. If symptoms did not improve, we anticipated ordering an MRI or a referral to PT this visit. Date of injury: 2019    Ms. Laurent Bhandari reports that her symptoms have improved. She notes left foot mainly hurts if she is on her feet for 30 minutes or more. She endorses benefit from Tramadol. The patient has h/o DM and focal segmental glomerulosclerosis. She takes Metformin. She cannot take NSAIDs. Visit Vitals  /84   Pulse 79   Temp 97.9 °F (36.6 °C) (Oral)   Resp 16   Ht 5' 5\" (1.651 m)   Wt 223 lb (101.2 kg)   SpO2 94%   BMI 37.11 kg/m²     Appearance: Alert, well appearing and pleasant patient who is in no distress, oriented to person, place/time, and who follows commands. This patient is accompanied in the examination room by her boyfriend. Dementia: no dementia  Psychiatric: Affect and mood are appropriate.  Patient arrives to office via: without assistive device  HEENT: Head normocephalic & atraumatic. Both pupils are round, non icteric sclera   Eye: EOM are intact and sclera are clear    Neck: ROM WNL and JVD neck is not present     Hearings Intact, does not require hearing aid device  Respiratory: Breathing is unlabored without accessory chest muscle use  Cardiovascular/Peripheral Vascular: Normal Pulses to each foot    Left ACHILLES GASTROCNEMIUS COMPLEX,PLANTAR FASCIA    Gait: antalgic  Tenderness: mild to moderate Achilles tendon sheath Insertional point    Mild tenderness to the posterior tibial tendon   NO Noninsertional Achilles tendon tenderness   Calf tenderness: ATFL and Achilles   Soft, supple, non tender, non taut lower extremity compartments   NONE to Medial Malleolar, 4/5 Met base midfoot, achilles, tib post, or   NONE to syndesmosis. no to plantar fascia, or central calcaneal region  Deformity/Swelling:  NO  Insertional point of Distal Achilles Tendon:    NO Fusiform noninsertional focal tendinopathy   NO Ester's Deformity Present  Cutaneous: No rashes, skin patches, wounds, or abrasions to the lower legs           Warm and Normal color. No regions of expressible drainage. Medial Border of Tibia Region: absent           Skin color, texture, turgor normal. Normal.  Joint Motion: ROM Ankle:Normal , Hindfoot: (ST,TN,CC Normal}, Forefoot toes:Normal  Neurologic Exam: Neuro: Motor: normal 5/5 strength in all tested muscle groups and Sensory : no sensory deficits noted. No abnormal hand/wrist, foot/ankle, or facial/neck tremors. Contractures: Gastrocnemius Contracture present. Achilles Contractures absent.    Joint / Tendon Stability:    No anterolateral or varus instability of the Ankle or Subtalar Joints              No peroneal tendon instability present with ankle circumduction  Alignment:  Normal Foot Alignment and  Flexible  Vascular: Normal Pulses/ NL Capillary refill, No evidence of DVT seen on physical exam.   No calf swelling, no tenderness to calf. Varicosities Lower Limbs :none  Lymphatic:  No Evidence of Lymphedema    CHART REVIEW     Past Medical History:   Diagnosis Date    Acid reflux     Anemia     Asthma     Depressed     Diabetes 1.5, managed as type 2 ([facility] [address]     FSGS (focal segmental glomerulosclerosis)     Gestational diabetes     Ill-defined condition     nfsg    Neuropathy     SAB (spontaneous )     Vaginal delivery     X2     Current Outpatient Medications   Medication Sig    traMADol (ULTRAM) 50 mg tablet Take 1 Tab by mouth every six (6) hours as needed for Pain for up to 3 days. Max Daily Amount: 200 mg.  Frodrpilh-Cjybodwdo-Swpkxrgjv 500-500-30 mg cmpk Take  by mouth.  albuterol (PROVENTIL HFA, VENTOLIN HFA, PROAIR HFA) 90 mcg/actuation inhaler 2 Puffs.  tiotropium bromide (SPIRIVA RESPIMAT) 1.25 mcg/actuation inhaler 2.5 mcg.  mometasone-formoterol (DULERA) 100-5 mcg/actuation HFA inhaler Take 2 Puffs by inhalation two (2) times a day.  omega 3-dha-epa-fish oil (FISH OIL) 60- mg cap 1,000 mg.    glucose blood VI test strips (ASCENSIA AUTODISC VI, ONE TOUCH ULTRA TEST VI) strip Test Blood sugar three times a day 30 minutes before meals. DX E11.9  Machine name One Touch Delica    Blood-Glucose Meter monitoring kit Test Blood sugar three times a day 30 minutes before meals. DX E11.9  Machine name One Touch Delica    cholecalciferol (VITAMIN D3) 1,000 unit tablet 1,000 Units.  cycloSPORINE modified (GENGRAF, NEORAL) 25 mg capsule     Lancets misc Test Blood sugar three times a day 30 minutes before meals. DX E11.9  Machine name One Touch Delica    metFORMIN ER (GLUCOPHAGE XR) 500 mg tablet TAKE 2 TABS BY MOUTH ONCE A DAY.  montelukast (SINGULAIR) 10 mg tablet     pantoprazole (PROTONIX) 40 mg tablet Take 40 mg by mouth daily. No current facility-administered medications for this visit.       No Known Allergies  Past Surgical History: Procedure Laterality Date    HX CHOLECYSTECTOMY      HX GYN      btl    HX HERNIA REPAIR  2816    LAP UMBILICAL HERNIA REPAIR      LAP,TUBAL CAUTERY       Social History     Occupational History    Not on file   Tobacco Use    Smoking status: Former Smoker     Packs/day: 1.00     Years: 10.00     Pack years: 10.00     Types: Cigarettes     Last attempt to quit: 2017     Years since quittin.4    Smokeless tobacco: Never Used   Substance and Sexual Activity    Alcohol use: Yes     Alcohol/week: 6.0 standard drinks     Types: 3 Glasses of wine, 3 Cans of beer per week     Comment: PER MONTH    Drug use: No    Sexual activity: Yes     Partners: Male     Birth control/protection: Surgical     Comment: BTL     Family History   Problem Relation Age of Onset    Diabetes Mother     Hypertension Mother     Stroke Mother     Asthma Mother     Diabetes Father     Hypertension Father     Cancer Paternal Aunt     Heart Disease Son         REVIEW OF SYSTEMS : 2019  ALL BELOW ARE Negative except : SEE HPI     Constitutional: Negative for fever, chills and weight loss. Neg Weight Loss  Cardiovascular: Negative for chest pain, claudication and leg swelling. SOB, SMITH   Gastrointestinal/Urological: Negative for  pain, N/V/D/C, Blood in stool or urine,dysuria                         Hematuria, Incontinence, pelvic pain  Musculoskeletal: see HPI. Neurological: Negative for dizziness and weakness, headaches,Visual Changes             Confusion,  Or Seizures,   Psychiatric/Behavioral: Negative for depression, memory loss and substance abuse. Extremities:  Negative for hair changes, rash or skin lesion changes. Hematologic: Negative for Bleeding problems, bruising, pallor or swollen lymph nodes.   Peripheral Vascular: No calf pain, vascular vein tenderness to calf pain              No calf throbbing, posterior knee throbbing pain     DIAGNOSTIC IMAGING     NONE TODAY    Devendra Zuniga, MD  7/31/2019  9:22 AM         Written by Markell Saxena, as dictated by Dr. Brianda Snider. I, Dr. Lamar Speaks, confirm that all documentation is accurate.

## 2019-08-14 ENCOUNTER — APPOINTMENT (OUTPATIENT)
Dept: PHYSICAL THERAPY | Age: 39
End: 2019-08-14

## 2019-08-16 ENCOUNTER — APPOINTMENT (OUTPATIENT)
Dept: PHYSICAL THERAPY | Age: 39
End: 2019-08-16

## 2019-11-13 ENCOUNTER — OFFICE VISIT (OUTPATIENT)
Dept: FAMILY MEDICINE CLINIC | Age: 39
End: 2019-11-13

## 2019-11-13 VITALS
DIASTOLIC BLOOD PRESSURE: 62 MMHG | HEART RATE: 81 BPM | HEIGHT: 65 IN | RESPIRATION RATE: 16 BRPM | WEIGHT: 229 LBS | TEMPERATURE: 98.1 F | BODY MASS INDEX: 38.15 KG/M2 | SYSTOLIC BLOOD PRESSURE: 134 MMHG | OXYGEN SATURATION: 97 %

## 2019-11-13 DIAGNOSIS — R53.83 OTHER FATIGUE: Primary | ICD-10-CM

## 2019-11-13 DIAGNOSIS — E55.9 VITAMIN D DEFICIENCY: ICD-10-CM

## 2019-11-13 DIAGNOSIS — Z11.1 SCREENING-PULMONARY TB: ICD-10-CM

## 2019-11-13 RX ORDER — MONTELUKAST SODIUM 10 MG/1
10 TABLET ORAL DAILY
Qty: 90 TAB | Refills: 1 | Status: SHIPPED | OUTPATIENT
Start: 2019-11-13 | End: 2020-01-23

## 2019-11-13 NOTE — PATIENT INSTRUCTIONS
Fatigue: Care Instructions  Your Care Instructions    Fatigue is a feeling of tiredness, exhaustion, or lack of energy. You may feel fatigue because of too much or not enough activity. It can also come from stress, lack of sleep, boredom, and poor diet. Many medical problems, such as viral infections, can cause fatigue. Emotional problems, especially depression, are often the cause of fatigue. Fatigue is most often a symptom of another problem. Treatment for fatigue depends on the cause. For example, if you have fatigue because you have a certain health problem, treating this problem also treats your fatigue. If depression or anxiety is the cause, treatment may help. Follow-up care is a key part of your treatment and safety. Be sure to make and go to all appointments, and call your doctor if you are having problems. It's also a good idea to know your test results and keep a list of the medicines you take. How can you care for yourself at home? · Get regular exercise. But don't overdo it. Go back and forth between rest and exercise. · Get plenty of rest.  · Eat a healthy diet. Do not skip meals, especially breakfast.  · Reduce your use of caffeine, tobacco, and alcohol. Caffeine is most often found in coffee, tea, cola drinks, and chocolate. · Limit medicines that can cause fatigue. This includes tranquilizers and cold and allergy medicines. When should you call for help? Watch closely for changes in your health, and be sure to contact your doctor if:    · You have new symptoms such as fever or a rash.     · Your fatigue gets worse.     · You have been feeling down, depressed, or hopeless. Or you may have lost interest in things that you usually enjoy.     · You are not getting better as expected. Where can you learn more? Go to http://gigi-staci.info/. Enter A113 in the search box to learn more about \"Fatigue: Care Instructions. \"  Current as of: June 26, 2019  Content Version: 12.2  © 3120-0141 Healthwise, Incorporated. Care instructions adapted under license by [employer] (which disclaims liability or warranty for this information). If you have questions about a medical condition or this instruction, always ask your healthcare professional. [address] any warranty or liability for your use of this information.

## 2019-11-13 NOTE — PROGRESS NOTES
HISTORY OF PRESENT ILLNESS  Elsi Yost is a 42 y.o. female. HPI  Patient is here today for evaluation and treatment of: Allergies / Fatigue    Allergies:  Pt is on singulair; She need refills. Has a h/o vitamin D deficiency. She has a h/o FSGF    Fatigue:   Present for a few months  Has decreased energy  Takes a MVI; this may help  Pt does not sleep well. Tries to go to bed by 9 pm.  May awaken at 6:00-6:30. Has multiple awakenings throughout the night  She has been started on Remeron. She has bad dreams on Remeron  She was admitted 10/2019 for PTSD and major depression. She is trying to see Psychiatry; She has found the [facility] as a resource thus far. Pt has had a recent UDS for a job where she test ed positive for Methamphetamine and a \"narcotic\" per pt ( ultram used for an ankle pain). Current Outpatient Medications:     MULTIVITAMIN PO, Take 3 gummies by mouth twice a day., Disp: , Rfl:     mirtazapine (REMERON PO), Take  by mouth., Disp: , Rfl:     buspirone HCl (BUSPAR PO), Take  by mouth., Disp: , Rfl:     allergy injection, Allergy injections once a week., Disp: , Rfl:     Fe/calcium carb/vitamin D3/min (IRON-CALCIUM CARB-VIT D3-MIN PO), Take  by mouth., Disp: , Rfl:     albuterol (PROVENTIL HFA, VENTOLIN HFA, PROAIR HFA) 90 mcg/actuation inhaler, Take 2 Puffs by inhalation. , Disp: , Rfl:     mometasone-formoterol (DULERA) 100-5 mcg/actuation HFA inhaler, Take 2 Puffs by inhalation two (2) times a day., Disp: 1 Inhaler, Rfl: 0    omega 3-dha-epa-fish oil (FISH OIL) 60- mg cap, Take 1,000 mg by mouth daily. , Disp: , Rfl:     glucose blood VI test strips (ASCENSIA AUTODISC VI, ONE TOUCH ULTRA TEST VI) strip, Blood sugar check twice daily. , Disp: , Rfl:     Blood-Glucose Meter monitoring kit, Use as directed to check blood sugar twice a day., Disp: , Rfl:     cycloSPORINE modified (GENGRAF, NEORAL) 25 mg capsule, Take 25 mg by mouth two (2) times a day., Disp: , Rfl:   Lancets misc, Blood sugar check twice a day., Disp: , Rfl:     metFORMIN ER (GLUCOPHAGE XR) 500 mg tablet, Take 500 mg by mouth two (2) times daily (with meals). , Disp: , Rfl:     montelukast (SINGULAIR) 10 mg tablet, Take 10 mg by mouth daily. , Disp: , Rfl:     pantoprazole (PROTONIX) 40 mg tablet, Take 40 mg by mouth daily. , Disp: , Rfl:       PMH,  Meds, Allergies, Family History, Social history reviewed    Review of Systems   Constitutional: Negative for chills and fever. Respiratory: Negative for shortness of breath and wheezing. Cardiovascular: Negative for chest pain and palpitations. Physical Exam   Constitutional: She appears well-developed and well-nourished. No distress. Cardiovascular: Normal rate and regular rhythm. Exam reveals no gallop and no friction rub. No murmur heard. Pulmonary/Chest: No respiratory distress. She has no wheezes. Musculoskeletal: She exhibits no edema. Nursing note and vitals reviewed. ASSESSMENT and PLAN    ICD-10-CM ICD-9-CM    1. Other fatigue X67.49 811.82 METABOLIC PANEL, BASIC      CBC WITH AUTOMATED DIFF      TSH 3RD GENERATION   2.  Vitamin D deficiency E55.9 268.9 VITAMIN D, 25 HYDROXY   3. Screening-pulmonary TB Z11.1 V74.1 AMB POC TUBERCULOSIS, INTRADERMAL (SKIN TEST)       As above, all new to this provider   treatment plan as listed below  Orders Placed This Encounter    METABOLIC PANEL, BASIC    CBC WITH AUTOMATED DIFF    TSH 3RD GENERATION    VITAMIN D, 25 HYDROXY    METABOLIC PANEL, BASIC    VITAMIN D, 25 HYDROXY    TSH 3RD GENERATION    CBC WITH AUTOMATED DIFF    AMB POC TUBERCULOSIS, INTRADERMAL (SKIN TEST)    MULTIVITAMIN PO    mirtazapine (REMERON PO)    buspirone HCl (BUSPAR PO)    allergy injection    montelukast (SINGULAIR) 10 mg tablet    Fe/calcium carb/vitamin D3/min (IRON-CALCIUM CARB-VIT D3-MIN PO)     Labs as ordered  Refilled meds needed  meds reconciled  Follow-up and Dispositions    · Return in about 8 weeks (around 1/8/2020). An After Visit Summary was printed and given to the patient. This has been fully explained to the patient, who indicates understanding.

## 2019-11-13 NOTE — PROGRESS NOTES
PPD Placement note  Juan Blum, 42 y.o. female is here today for placement of PPD test  Reason for PPD test:  employment  Pt taken PPD test before: yes  Verified in allergy area and with patient that they are not allergic to the products PPD is made of (Phenol or Tween). yes  Is patient taking any oral or IV steroid medication now or have they taken it in the last month? no  Has the patient ever received the BCG vaccine?: no  Has the patient been in recent contact with anyone known or suspected of having active TB disease?: no       Date of exposure (if applicable): n/a       Name of person they were exposed to (if applicable): n/a  Patient's Country of origin?: [place]  O: Alert and oriented in NAD. Patient presented to office for PPD placement. Reviewed allergies. Patient states has never had a positive PPD in the past. PPD injection at left forearm time of placement 1328. Patient advised to return 48-72hrs for reading. Patient tolerated injection well and left ambulatory without any complaints. Patient verbalizes understanding.

## 2019-11-14 LAB
25(OH)D3 SERPL-MCNC: 18.1 NG/ML (ref 32–100)
ABSOLUTE LYMPHOCYTE COUNT, 10803: 2.6 K/UL (ref 1–4.8)
ANION GAP SERPL CALC-SCNC: 12 MMOL/L
BASOPHILS # BLD: 0 K/UL (ref 0–0.2)
BASOPHILS NFR BLD: 1 % (ref 0–2)
BUN SERPL-MCNC: 10 MG/DL (ref 6–22)
CALCIUM SERPL-MCNC: 9.2 MG/DL (ref 8.4–10.5)
CHLORIDE SERPL-SCNC: 98 MMOL/L (ref 98–110)
CO2 SERPL-SCNC: 28 MMOL/L (ref 20–32)
CREAT SERPL-MCNC: 0.7 MG/DL (ref 0.5–1.2)
EOSINOPHIL # BLD: 0.1 K/UL (ref 0–0.5)
EOSINOPHIL NFR BLD: 1 % (ref 0–6)
ERYTHROCYTE [DISTWIDTH] IN BLOOD BY AUTOMATED COUNT: 15.2 % (ref 10–15.5)
GFRAA, 66117: >60
GFRNA, 66118: >60
GLUCOSE SERPL-MCNC: 88 MG/DL (ref 70–99)
GRANULOCYTES,GRANS: 48 % (ref 40–75)
HCT VFR BLD AUTO: 39.6 % (ref 35.1–46.5)
HGB BLD-MCNC: 12.2 G/DL (ref 11.7–15.5)
LYMPHOCYTES, LYMLT: 40 % (ref 20–45)
MCH RBC QN AUTO: 26 PG (ref 26–34)
MCHC RBC AUTO-ENTMCNC: 31 G/DL (ref 31–36)
MCV RBC AUTO: 83 FL (ref 80–95)
MONOCYTES # BLD: 0.6 K/UL (ref 0.1–1)
MONOCYTES NFR BLD: 10 % (ref 3–12)
NEUTROPHILS # BLD AUTO: 3.2 K/UL (ref 1.8–7.7)
PLATELET # BLD AUTO: 306 K/UL (ref 140–440)
PMV BLD AUTO: 10.5 FL (ref 9–13)
POTASSIUM SERPL-SCNC: 4.1 MMOL/L (ref 3.5–5.5)
RBC # BLD AUTO: 4.78 M/UL (ref 3.8–5.2)
SODIUM SERPL-SCNC: 138 MMOL/L (ref 133–145)
TSH SERPL DL<=0.005 MIU/L-ACNC: 2.1 MCU/ML (ref 0.27–4.2)
WBC # BLD AUTO: 6.5 K/UL (ref 4–11)

## 2019-11-15 LAB
MM INDURATION POC: 0 MM (ref 0–5)
PPD POC: NEGATIVE NEGATIVE

## 2019-12-05 RX ORDER — ERGOCALCIFEROL 1.25 MG/1
50000 CAPSULE ORAL
Qty: 12 CAP | Refills: 0 | Status: SHIPPED | OUTPATIENT
Start: 2019-12-05 | End: 2020-02-21

## 2019-12-20 ENCOUNTER — TELEPHONE (OUTPATIENT)
Dept: FAMILY MEDICINE CLINIC | Age: 39
End: 2019-12-20

## 2019-12-20 NOTE — TELEPHONE ENCOUNTER
----- Message from Patrica Drummond MD sent at 12/5/2019 11:17 AM EST -----  Need to re[place vitamin D. To be notified by nursing.

## 2019-12-20 NOTE — TELEPHONE ENCOUNTER
Spoke with patient to inform as per Dr. Floyd Ped labs are essentially stable. Informed that Vitamin D level was low and that a prescription for Vitamin D has been sent to the pharmacy. Patient verbalized understanding.

## 2019-12-20 NOTE — PROGRESS NOTES
Patient notified of lab results and prescription sent to the pharmacy on telephone encounter dated 12/20/2019.

## 2020-01-23 ENCOUNTER — APPOINTMENT (OUTPATIENT)
Dept: CT IMAGING | Age: 40
End: 2020-01-23
Attending: EMERGENCY MEDICINE
Payer: MEDICAID

## 2020-01-23 ENCOUNTER — HOSPITAL ENCOUNTER (EMERGENCY)
Age: 40
Discharge: HOME | End: 2020-01-24
Attending: EMERGENCY MEDICINE
Payer: MEDICAID

## 2020-01-23 DIAGNOSIS — R07.9 CHEST PAIN, UNSPECIFIED TYPE: Primary | ICD-10-CM

## 2020-01-23 LAB
ANION GAP SERPL CALC-SCNC: 7 MMOL/L (ref 3–18)
BASOPHILS # BLD: 0 K/UL (ref 0–0.1)
BASOPHILS NFR BLD: 0 % (ref 0–2)
BUN SERPL-MCNC: 15 MG/DL (ref 7–18)
BUN/CREAT SERPL: 17 (ref 12–20)
CALCIUM SERPL-MCNC: 8.6 MG/DL (ref 8.5–10.1)
CHLORIDE SERPL-SCNC: 109 MMOL/L (ref 100–111)
CK MB CFR SERPL CALC: NORMAL % (ref 0–4)
CK MB SERPL-MCNC: <1 NG/ML (ref 5–25)
CK SERPL-CCNC: 138 U/L (ref 26–192)
CO2 SERPL-SCNC: 27 MMOL/L (ref 21–32)
CREAT SERPL-MCNC: 0.9 MG/DL (ref 0.6–1.3)
D DIMER PPP FEU-MCNC: 1.95 UG/ML(FEU)
DIFFERENTIAL METHOD BLD: ABNORMAL
EOSINOPHIL # BLD: 0.1 K/UL (ref 0–0.4)
EOSINOPHIL NFR BLD: 1 % (ref 0–5)
ERYTHROCYTE [DISTWIDTH] IN BLOOD BY AUTOMATED COUNT: 14.6 % (ref 11.6–14.5)
GLUCOSE SERPL-MCNC: 97 MG/DL (ref 74–99)
HCT VFR BLD AUTO: 35.6 % (ref 35–45)
HGB BLD-MCNC: 11.5 G/DL (ref 12–16)
LIPASE SERPL-CCNC: 158 U/L (ref 73–393)
LYMPHOCYTES # BLD: 3.4 K/UL (ref 0.9–3.6)
LYMPHOCYTES NFR BLD: 46 % (ref 21–52)
MCH RBC QN AUTO: 25.5 PG (ref 24–34)
MCHC RBC AUTO-ENTMCNC: 32.3 G/DL (ref 31–37)
MCV RBC AUTO: 78.9 FL (ref 74–97)
MONOCYTES # BLD: 0.6 K/UL (ref 0.05–1.2)
MONOCYTES NFR BLD: 9 % (ref 3–10)
NEUTS SEG # BLD: 3.2 K/UL (ref 1.8–8)
NEUTS SEG NFR BLD: 44 % (ref 40–73)
PLATELET # BLD AUTO: 295 K/UL (ref 135–420)
PMV BLD AUTO: 9.8 FL (ref 9.2–11.8)
POTASSIUM SERPL-SCNC: 3.9 MMOL/L (ref 3.5–5.5)
RBC # BLD AUTO: 4.51 M/UL (ref 4.2–5.3)
SODIUM SERPL-SCNC: 143 MMOL/L (ref 136–145)
TROPONIN I SERPL-MCNC: <0.02 NG/ML (ref 0–0.04)
WBC # BLD AUTO: 7.3 K/UL (ref 4.6–13.2)

## 2020-01-23 PROCEDURE — 96374 THER/PROPH/DIAG INJ IV PUSH: CPT

## 2020-01-23 PROCEDURE — 93005 ELECTROCARDIOGRAM TRACING: CPT

## 2020-01-23 PROCEDURE — 85379 FIBRIN DEGRADATION QUANT: CPT

## 2020-01-23 PROCEDURE — 85025 COMPLETE CBC W/AUTO DIFF WBC: CPT

## 2020-01-23 PROCEDURE — 74011636320 HC RX REV CODE- 636/320: Performed by: EMERGENCY MEDICINE

## 2020-01-23 PROCEDURE — 80048 BASIC METABOLIC PNL TOTAL CA: CPT

## 2020-01-23 PROCEDURE — 99284 EMERGENCY DEPT VISIT MOD MDM: CPT

## 2020-01-23 PROCEDURE — 96375 TX/PRO/DX INJ NEW DRUG ADDON: CPT

## 2020-01-23 PROCEDURE — 82550 ASSAY OF CK (CPK): CPT

## 2020-01-23 PROCEDURE — 71275 CT ANGIOGRAPHY CHEST: CPT

## 2020-01-23 PROCEDURE — 74011250636 HC RX REV CODE- 250/636: Performed by: EMERGENCY MEDICINE

## 2020-01-23 PROCEDURE — 83690 ASSAY OF LIPASE: CPT

## 2020-01-23 RX ORDER — FAMOTIDINE 10 MG/ML
20 INJECTION INTRAVENOUS
Status: COMPLETED | OUTPATIENT
Start: 2020-01-23 | End: 2020-01-23

## 2020-01-23 RX ORDER — ONDANSETRON 2 MG/ML
4 INJECTION INTRAMUSCULAR; INTRAVENOUS
Status: COMPLETED | OUTPATIENT
Start: 2020-01-23 | End: 2020-01-23

## 2020-01-23 RX ORDER — SODIUM CHLORIDE 9 MG/ML
125 INJECTION, SOLUTION INTRAVENOUS ONCE
Status: COMPLETED | OUTPATIENT
Start: 2020-01-23 | End: 2020-01-24

## 2020-01-23 RX ORDER — KETOROLAC TROMETHAMINE 15 MG/ML
15 INJECTION, SOLUTION INTRAMUSCULAR; INTRAVENOUS
Status: DISCONTINUED | OUTPATIENT
Start: 2020-01-23 | End: 2020-01-24 | Stop reason: HOSPADM

## 2020-01-23 RX ORDER — LOSARTAN POTASSIUM 25 MG/1
TABLET ORAL DAILY
COMMUNITY
End: 2021-04-30 | Stop reason: SDUPTHER

## 2020-01-23 RX ORDER — MORPHINE SULFATE 4 MG/ML
4 INJECTION INTRAVENOUS
Status: COMPLETED | OUTPATIENT
Start: 2020-01-23 | End: 2020-01-23

## 2020-01-23 RX ADMIN — FAMOTIDINE 20 MG: 10 INJECTION, SOLUTION INTRAVENOUS at 22:13

## 2020-01-23 RX ADMIN — SODIUM CHLORIDE 125 ML/HR: 900 INJECTION, SOLUTION INTRAVENOUS at 22:12

## 2020-01-23 RX ADMIN — ONDANSETRON 4 MG: 2 INJECTION INTRAMUSCULAR; INTRAVENOUS at 22:58

## 2020-01-23 RX ADMIN — IOPAMIDOL 100 ML: 755 INJECTION, SOLUTION INTRAVENOUS at 23:19

## 2020-01-23 RX ADMIN — MORPHINE SULFATE 4 MG: 4 INJECTION INTRAVENOUS at 22:57

## 2020-01-23 NOTE — LETTER
NOTIFICATION RETURN TO WORK / SCHOOL 
 
1/24/2020 2:10 AM 
 
Ms. Rose Norman [facility] [address] [place] [id number] To Whom It May Concern: 
 
Rose Norman is currently under the care of [address] [facility] EMERGENCY DEPT. She will return to work/school on: 1/27/20 If there are questions or concerns please have the patient contact our office.  
 
 
 
Sincerely, 
 
 
Isaías Jamison MD

## 2020-01-24 VITALS
BODY MASS INDEX: 38.27 KG/M2 | HEART RATE: 75 BPM | SYSTOLIC BLOOD PRESSURE: 116 MMHG | WEIGHT: 230 LBS | DIASTOLIC BLOOD PRESSURE: 72 MMHG | OXYGEN SATURATION: 98 % | RESPIRATION RATE: 13 BRPM | TEMPERATURE: 97.2 F

## 2020-01-24 LAB
ATRIAL RATE: 80 BPM
CALCULATED P AXIS, ECG09: 57 DEGREES
CALCULATED R AXIS, ECG10: 15 DEGREES
CALCULATED T AXIS, ECG11: 26 DEGREES
CK MB CFR SERPL CALC: NORMAL % (ref 0–4)
CK MB SERPL-MCNC: <1 NG/ML (ref 5–25)
CK SERPL-CCNC: 151 U/L (ref 26–192)
DIAGNOSIS, 93000: NORMAL
P-R INTERVAL, ECG05: 194 MS
Q-T INTERVAL, ECG07: 374 MS
QRS DURATION, ECG06: 82 MS
QTC CALCULATION (BEZET), ECG08: 431 MS
TROPONIN I SERPL-MCNC: <0.02 NG/ML (ref 0–0.04)
VENTRICULAR RATE, ECG03: 80 BPM

## 2020-01-24 PROCEDURE — 82550 ASSAY OF CK (CPK): CPT

## 2020-01-24 RX ORDER — ONDANSETRON 4 MG/1
4 TABLET, ORALLY DISINTEGRATING ORAL
Qty: 14 TAB | Refills: 0 | Status: SHIPPED | OUTPATIENT
Start: 2020-01-24

## 2020-01-24 RX ORDER — TRAMADOL HYDROCHLORIDE 50 MG/1
50 TABLET ORAL
Qty: 15 TAB | Refills: 0 | Status: SHIPPED | OUTPATIENT
Start: 2020-01-24 | End: 2020-01-31

## 2020-01-24 NOTE — ED NOTES
I have reviewed discharge instructions with the patient. The patient verbalized understanding. Patient armband removed and shredded  Current Discharge Medication List      START taking these medications    Details   traMADol (ULTRAM) 50 mg tablet Take 1 Tab by mouth every six (6) hours as needed for Pain for up to 7 days. Max Daily Amount: 200 mg. Qty: 15 Tab, Refills: 0    Associated Diagnoses: Chest pain, unspecified type      ondansetron (ZOFRAN ODT) 4 mg disintegrating tablet Take 1 Tab by mouth every eight (8) hours as needed for Nausea.   Qty: 14 Tab, Refills: 0

## 2020-01-24 NOTE — ED PROVIDER NOTES
Pt c/o mid chest pain, x 2 days, wax. waning. H/o same, dx w gerd, attributes to the gerd, but worse now than usual.  No radiation. Mild sob. No vomiting. No sweating. No fever. No weakness or numbness . Pain dull. Not preg per pt. Past Medical History:   Diagnosis Date    Acid reflux     Anemia     Asthma     Depressed     Diabetes 1.5, managed as type 2 ([facility] [address]     FSGS (focal segmental glomerulosclerosis)     Gestational diabetes     Ill-defined condition     nfsg    Neuropathy     SAB (spontaneous )     Vaginal delivery     X2       Past Surgical History:   Procedure Laterality Date    HX CHOLECYSTECTOMY      HX GYN      btl    HX HERNIA REPAIR      HX TUBAL LIGATION      LAP UMBILICAL HERNIA REPAIR      LAP,TUBAL CAUTERY           Family History:   Problem Relation Age of Onset    Diabetes Mother     Hypertension Mother     Stroke Mother     Asthma Mother     Diabetes Father     Hypertension Father     Cancer Paternal Aunt     Heart Disease Son        Social History     Socioeconomic History    Marital status: SINGLE     Spouse name: Not on file    Number of children: Not on file    Years of education: Not on file    Highest education level: Not on file   Occupational History    Not on file   Social Needs    Financial resource strain: Not on file    Food insecurity:     Worry: Not on file     Inability: Not on file    Transportation needs:     Medical: Not on file     Non-medical: Not on file   Tobacco Use    Smoking status: Former Smoker     Packs/day: 1.00     Years: 10.00     Pack years: 10.00     Types: Cigarettes     Last attempt to quit: 2017     Years since quittin.9    Smokeless tobacco: Never Used   Substance and Sexual Activity    Alcohol use:  Yes     Alcohol/week: 6.0 standard drinks     Types: 3 Glasses of wine, 3 Cans of beer per week     Comment: PER MONTH    Drug use: No    Sexual activity: Yes     Partners: Male     Birth control/protection: Surgical     Comment: BTL/     Lifestyle    Physical activity:     Days per week: Not on file     Minutes per session: Not on file    Stress: Not on file   Relationships    Social connections:     Talks on phone: Not on file     Gets together: Not on file     Attends Oriental orthodox service: Not on file     Active member of club or organization: Not on file     Attends meetings of clubs or organizations: Not on file     Relationship status: Not on file    Intimate partner violence:     Fear of current or ex partner: Not on file     Emotionally abused: Not on file     Physically abused: Not on file     Forced sexual activity: Not on file   Other Topics Concern    Not on file   Social History Narrative    Not on file         ALLERGIES: Patient has no known allergies. Review of Systems   Constitutional: Negative for fever. HENT: Negative for congestion. Respiratory: Negative for cough. Cardiovascular: Positive for chest pain. Gastrointestinal: Negative for abdominal pain and vomiting. Musculoskeletal: Negative for back pain. Skin: Negative for rash. Neurological: Negative for light-headedness. All other systems reviewed and are negative. Vitals:    01/23/20 2135 01/23/20 2330 01/24/20 0100   BP: 146/84  116/72   Pulse: 75 74 75   Resp: 12 15 13   Temp: 97.5 °F (36.4 °C) 97.2 °F (36.2 °C) 97.2 °F (36.2 °C)   SpO2: 100% 100% 98%   Weight: 104.3 kg (230 lb)              Physical Exam  Vitals signs and nursing note reviewed. Constitutional:       Appearance: She is well-developed. She is not diaphoretic. HENT:      Head: Normocephalic and atraumatic. Eyes:      Pupils: Pupils are equal, round, and reactive to light. Neck:      Musculoskeletal: Normal range of motion. Cardiovascular:      Rate and Rhythm: Normal rate and regular rhythm. Heart sounds: No murmur. Pulmonary:      Effort: Pulmonary effort is normal.      Breath sounds: No wheezing. Abdominal:      Palpations: Abdomen is soft. Tenderness: There is no tenderness. Musculoskeletal:         General: No tenderness. Skin:     General: Skin is dry. Capillary Refill: Capillary refill takes less than 2 seconds. Findings: No rash. Neurological:      Mental Status: She is alert and oriented to person, place, and time.           MDM       Procedures      Vitals:  Patient Vitals for the past 12 hrs:   Temp Pulse Resp BP SpO2   01/24/20 0100 97.2 °F (36.2 °C) 75 13 116/72 98 %   01/23/20 2330 97.2 °F (36.2 °C) 74 15 -- 100 %   01/23/20 2135 97.5 °F (36.4 °C) 75 12 146/84 100 %         Medications ordered:   Medications   0.9% sodium chloride infusion (0 mL/hr IntraVENous IV Completed 1/24/20 0220)   famotidine (PF) (PEPCID) injection 20 mg (20 mg IntraVENous Given 1/23/20 2213)   morphine injection 4 mg (4 mg IntraVENous Given 1/23/20 2257)   ondansetron (ZOFRAN) injection 4 mg (4 mg IntraVENous Given 1/23/20 2258)   iopamidoL (ISOVUE-370) 76 % injection  mL (100 mL IntraVENous Given 1/23/20 2319)         Lab findings:  Recent Results (from the past 12 hour(s))   EKG, 12 LEAD, INITIAL    Collection Time: 01/23/20  9:25 PM   Result Value Ref Range    Ventricular Rate 80 BPM    Atrial Rate 80 BPM    P-R Interval 194 ms    QRS Duration 82 ms    Q-T Interval 374 ms    QTC Calculation (Bezet) 431 ms    Calculated P Axis 57 degrees    Calculated R Axis 15 degrees    Calculated T Axis 26 degrees    Diagnosis       Normal sinus rhythm  Normal ECG  When compared with ECG of 18-JUL-2018 16:40,  No significant change was found     CBC WITH AUTOMATED DIFF    Collection Time: 01/23/20 10:14 PM   Result Value Ref Range    WBC 7.3 4.6 - 13.2 K/uL    RBC 4.51 4.20 - 5.30 M/uL    HGB 11.5 (L) 12.0 - 16.0 g/dL    HCT 35.6 35.0 - 45.0 %    MCV 78.9 74.0 - 97.0 FL    MCH 25.5 24.0 - 34.0 PG    MCHC 32.3 31.0 - 37.0 g/dL    RDW 14.6 (H) 11.6 - 14.5 %    PLATELET 626 805 - 486 K/uL    MPV 9.8 9.2 - 11.8 FL    NEUTROPHILS 44 40 - 73 %    LYMPHOCYTES 46 21 - 52 %    MONOCYTES 9 3 - 10 %    EOSINOPHILS 1 0 - 5 %    BASOPHILS 0 0 - 2 %    ABS. NEUTROPHILS 3.2 1.8 - 8.0 K/UL    ABS. LYMPHOCYTES 3.4 0.9 - 3.6 K/UL    ABS. MONOCYTES 0.6 0.05 - 1.2 K/UL    ABS. EOSINOPHILS 0.1 0.0 - 0.4 K/UL    ABS. BASOPHILS 0.0 0.0 - 0.1 K/UL    DF AUTOMATED     METABOLIC PANEL, BASIC    Collection Time: 01/23/20 10:14 PM   Result Value Ref Range    Sodium 143 136 - 145 mmol/L    Potassium 3.9 3.5 - 5.5 mmol/L    Chloride 109 100 - 111 mmol/L    CO2 27 21 - 32 mmol/L    Anion gap 7 3.0 - 18 mmol/L    Glucose 97 74 - 99 mg/dL    BUN 15 7.0 - 18 MG/DL    Creatinine 0.90 0.6 - 1.3 MG/DL    BUN/Creatinine ratio 17 12 - 20      GFR est AA >60 >60 ml/min/1.73m2    GFR est non-AA >60 >60 ml/min/1.73m2    Calcium 8.6 8.5 - 10.1 MG/DL   CARDIAC PANEL,(CK, CKMB & TROPONIN)    Collection Time: 01/23/20 10:14 PM   Result Value Ref Range     26 - 192 U/L    CK - MB <1.0 <3.6 ng/ml    CK-MB Index  0.0 - 4.0 %     CALCULATION NOT PERFORMED WHEN RESULT IS BELOW LINEAR LIMIT    Troponin-I, QT <0.02 0.0 - 0.045 NG/ML   D DIMER    Collection Time: 01/23/20 10:14 PM   Result Value Ref Range    D DIMER 1.95 (H) <0.46 ug/ml(FEU)   LIPASE    Collection Time: 01/23/20 10:14 PM   Result Value Ref Range    Lipase 158 73 - 393 U/L   CARDIAC PANEL,(CK, CKMB & TROPONIN)    Collection Time: 01/24/20  1:01 AM   Result Value Ref Range     26 - 192 U/L    CK - MB <1.0 <3.6 ng/ml    CK-MB Index  0.0 - 4.0 %     CALCULATION NOT PERFORMED WHEN RESULT IS BELOW LINEAR LIMIT    Troponin-I, QT <0.02 0.0 - 0.045 NG/ML           X-Ray, CT or other radiology findings or impressions:  CTA CHEST W OR W WO CONT   Final Result   IMPRESSION:   No evidence for pulmonary emboli. Progress notes, Consult notes or additional Procedure notes:   2:10 AM neg w/u inc trop x 2, ct for pe, no emc. Not c/w cad/pe/ptx/bacteremia/sepsis. Stable for dc and close f/u. Det ret inst given. Diagnosis:   1. Chest pain, unspecified type        Disposition: home    Follow-up Information     Follow up With Specialties Details Why Contact Info    Yanick Simon MD Family Practice Schedule an appointment as soon as possible for a visit in 2 days  [address] [facility]  [address]  [phone] [facility] EMERGENCY DEPT Emergency Medicine Go to As needed [address] [facility]    Rhys Gaffney MD Cardiology Schedule an appointment as soon as possible for a visit in 1 week As needed [facility]  [phone]             Discharge Medication List as of 1/24/2020  2:09 AM      START taking these medications    Details   traMADol (ULTRAM) 50 mg tablet Take 1 Tab by mouth every six (6) hours as needed for Pain for up to 7 days. Max Daily Amount: 200 mg., Print, Disp-15 Tab, R-0      ondansetron (ZOFRAN ODT) 4 mg disintegrating tablet Take 1 Tab by mouth every eight (8) hours as needed for Nausea. , Print, Disp-14 Tab, R-0         CONTINUE these medications which have NOT CHANGED    Details   losartan (COZAAR) 25 mg tablet Take  by mouth daily. , Historical Med      ergocalciferol (ERGOCALCIFEROL) 50,000 unit capsule Take 1 Cap by mouth every seven (7) days for 12 doses. , Normal, Disp-12 Cap, R-0      MULTIVITAMIN PO Take 3 gummies by mouth twice a day., Historical Med      allergy injection Allergy injections once a week., Historical Med      Fe/calcium carb/vitamin D3/min (IRON-CALCIUM CARB-VIT D3-MIN PO) Take  by mouth., Historical Med      albuterol (PROVENTIL HFA, VENTOLIN HFA, PROAIR HFA) 90 mcg/actuation inhaler Take 2 Puffs by inhalation. , Historical Med      mometasone-formoterol (DULERA) 100-5 mcg/actuation HFA inhaler Take 2 Puffs by inhalation two (2) times a day., Print, Disp-1 Inhaler, R-0      omega 3-dha-epa-fish oil (FISH OIL) 60- mg cap Take 1,000 mg by mouth daily., Historical Med      glucose blood VI test strips (ASCENSIA AUTODISC VI, ONE TOUCH ULTRA TEST VI) strip Blood sugar check twice daily. , Historical Med      Blood-Glucose Meter monitoring kit Use as directed to check blood sugar twice a day., Historical Med      cycloSPORINE modified (GENGRAF, NEORAL) 25 mg capsule Take 25 mg by mouth two (2) times a day., Historical Med      Lancets misc Blood sugar check twice a day., Historical Med      metFORMIN ER (GLUCOPHAGE XR) 500 mg tablet Take 500 mg by mouth two (2) times daily (with meals). , Historical Med      pantoprazole (PROTONIX) 40 mg tablet Take 40 mg by mouth daily. , Historical Med

## 2020-01-24 NOTE — ED NOTES
Bedside and Verbal shift change report given by Unruly NJ (offgoing nurse). Report included the following information SBAR, Kardex, ED Summary, Procedure Summary, Intake/Output, MAR, Accordion and Recent Results.

## 2020-01-24 NOTE — DISCHARGE INSTRUCTIONS
Return for pain, fever not resolving with motrin or tylenol, shortness of breath, vomiting, decreased fluid intake, weakness, numbness, dizziness, or any change or concerns. Patient Education        Chest Pain: Care Instructions  Your Care Instructions    There are many things that can cause chest pain. Some are not serious and will get better on their own in a few days. But some kinds of chest pain need more testing and treatment. Your doctor may have recommended a follow-up visit in the next 8 to 12 hours. If you are not getting better, you may need more tests or treatment. Even though your doctor has released you, you still need to watch for any problems. The doctor carefully checked you, but sometimes problems can develop later. If you have new symptoms or if your symptoms do not get better, get medical care right away. If you have worse or different chest pain or pressure that lasts more than 5 minutes or you passed out (lost consciousness), call 911 or seek other emergency help right away. A medical visit is only one step in your treatment. Even if you feel better, you still need to do what your doctor recommends, such as going to all suggested follow-up appointments and taking medicines exactly as directed. This will help you recover and help prevent future problems. How can you care for yourself at home? · Rest until you feel better. · Take your medicine exactly as prescribed. Call your doctor if you think you are having a problem with your medicine. · Do not drive after taking a prescription pain medicine. When should you call for help? Call 911 if:    · You passed out (lost consciousness).     · You have severe difficulty breathing.     · You have symptoms of a heart attack. These may include:  ? Chest pain or pressure, or a strange feeling in your chest.  ? Sweating. ? Shortness of breath. ? Nausea or vomiting.   ? Pain, pressure, or a strange feeling in your back, neck, jaw, or upper belly or in one or both shoulders or arms. ? Lightheadedness or sudden weakness. ? A fast or irregular heartbeat. After you call 911, the  may tell you to chew 1 adult-strength or 2 to 4 low-dose aspirin. Wait for an ambulance. Do not try to drive yourself.    Call your doctor today if:    · You have any trouble breathing.     · Your chest pain gets worse.     · You are dizzy or lightheaded, or you feel like you may faint.     · You are not getting better as expected.     · You are having new or different chest pain. Where can you learn more? Go to http://gigi-staci.info/. Enter A120 in the search box to learn more about \"Chest Pain: Care Instructions. \"  Current as of: June 26, 2019  Content Version: 12.2  © 8768-6637 [employer]. Care instructions adapted under license by [email or web address] (which disclaims liability or warranty for this information). If you have questions about a medical condition or this instruction, always ask your healthcare professional. [address] any warranty or liability for your use of this information.

## 2020-09-11 ENCOUNTER — TELEPHONE (OUTPATIENT)
Dept: FAMILY MEDICINE CLINIC | Age: 40
End: 2020-09-11

## 2020-09-11 NOTE — LETTER
NOTIFICATION RETURN TO WORK / SCHOOL 
 
10/15/2020 11:06 AM 
 
Ms. John Moreno [address] [facility] [address] To Whom It May Concern: 
 
John Moreno is currently a patient at [address] [place] [address]. Ms. Ander Ge has a reported history of Diabetes and Asthma. These conditions are considered high risk during the COVID- 19 pandemic. If there are questions or concerns please have the patient contact our office. Sincerely, Mary Sparrow MD

## 2020-09-15 NOTE — TELEPHONE ENCOUNTER
Pt states her job is requesting a letter stating that she had asthma & diabetes so they can make accommodations for her job.

## 2020-09-29 NOTE — TELEPHONE ENCOUNTER
Patient called wanting to know status of letter needed for job. Please email letter to patient when letter is ready per request    [email or web address]

## 2020-10-02 ENCOUNTER — APPOINTMENT (OUTPATIENT)
Dept: FAMILY MEDICINE CLINIC | Age: 40
End: 2020-10-02

## 2020-10-02 ENCOUNTER — VIRTUAL VISIT (OUTPATIENT)
Dept: FAMILY MEDICINE CLINIC | Age: 40
End: 2020-10-02
Payer: MEDICAID

## 2020-10-02 DIAGNOSIS — M79.604 PAIN IN BOTH LOWER EXTREMITIES: ICD-10-CM

## 2020-10-02 DIAGNOSIS — M79.604 PAIN IN BOTH LOWER EXTREMITIES: Primary | ICD-10-CM

## 2020-10-02 DIAGNOSIS — M79.605 PAIN IN BOTH LOWER EXTREMITIES: Primary | ICD-10-CM

## 2020-10-02 DIAGNOSIS — M79.605 PAIN IN BOTH LOWER EXTREMITIES: ICD-10-CM

## 2020-10-02 PROCEDURE — 99213 OFFICE O/P EST LOW 20 MIN: CPT | Performed by: NURSE PRACTITIONER

## 2020-10-02 RX ORDER — MONTELUKAST SODIUM 10 MG/1
10 TABLET ORAL DAILY
COMMUNITY

## 2020-10-02 RX ORDER — PREDNISONE 10 MG/1
TABLET ORAL
COMMUNITY
End: 2021-04-30 | Stop reason: ALTCHOICE

## 2020-10-02 RX ORDER — CETIRIZINE HCL 10 MG
10 TABLET ORAL DAILY
COMMUNITY

## 2020-10-02 RX ORDER — PRAVASTATIN SODIUM 10 MG/1
10 TABLET ORAL DAILY
COMMUNITY
Start: 2019-11-18 | End: 2020-11-18

## 2020-10-02 RX ORDER — CEFUROXIME AXETIL 500 MG/1
500 TABLET ORAL 2 TIMES DAILY
COMMUNITY
End: 2020-12-23 | Stop reason: ALTCHOICE

## 2020-10-02 NOTE — TELEPHONE ENCOUNTER
Patient has been waiting since Sept 11th for letter stating she can continue working for home due to medical conditions.  Please advise

## 2020-10-02 NOTE — PROGRESS NOTES
Ramirez Dela Cruz is a 47 y.o. female who was seen by synchronous (real-time) audio-video technology on 10/2/2020 for Leg Pain      Assessment & Plan:   Diagnoses and all orders for this visit:    1. Pain in both lower extremities  -     CBC WITH AUTOMATED DIFF; Future  -     METABOLIC PANEL, BASIC; Future      Symptomatic relief for above discussed. I spent at least 20 minutes on this visit with this established patient. 712  Subjective:   Patient states for the last week she has been having random pains in the back of her legs. States pain began behind her left knee, she experienced sharp pain. Comments she was unable to walk due to pain, reports she layed down and symptoms resolved after 1 hr. Admits to swelling in left ankle which is not now. Comments she is trying to stay hydrated. Patient was seen by ENT yesterday for asthma flare. Prescribed prednisone and ceftin for sinusitis and otitis media. States breathing feels better since beginning prednisone. Prior to Admission medications    Medication Sig Start Date End Date Taking? Authorizing Provider   metFORMIN ER (GLUCOPHAGE XR) 500 mg tablet Take 1 Tab by mouth two (2) times daily (with meals). 3/13/20   Adrien Otero MD   ondansetron (ZOFRAN ODT) 4 mg disintegrating tablet Take 1 Tab by mouth every eight (8) hours as needed for Nausea. 1/24/20   Lucas Bañuelos MD   losartan (COZAAR) 25 mg tablet Take  by mouth daily. Other, MD Viktor   MULTIVITAMIN PO Take 3 gummies by mouth twice a day. Provider, Historical   allergy injection Allergy injections once a week. Provider, Historical   Fe/calcium carb/vitamin D3/min (IRON-CALCIUM CARB-VIT D3-MIN PO) Take  by mouth. Provider, Historical   albuterol (PROVENTIL HFA, VENTOLIN HFA, PROAIR HFA) 90 mcg/actuation inhaler Take 2 Puffs by inhalation.  7/26/18   Provider, Historical   mometasone-formoterol (DULERA) 100-5 mcg/actuation HFA inhaler Take 2 Puffs by inhalation two (2) times a day. 7/18/18   Ramu Urban MD   omega 8-srg-htf-fish oil (FISH OIL) 60- mg cap Take 1,000 mg by mouth daily. 10/31/17   Provider, Historical   glucose blood VI test strips (ASCENSIA AUTODISC VI, ONE TOUCH ULTRA TEST VI) strip Blood sugar check twice daily. 3/1/17   Provider, Historical   Blood-Glucose Meter monitoring kit Use as directed to check blood sugar twice a day. 3/1/17   Provider, Historical   cycloSPORINE modified (GENGRAF, NEORAL) 25 mg capsule Take 25 mg by mouth two (2) times a day. 7/31/17   Provider, Historical   Lancets misc Blood sugar check twice a day. 3/1/17   Provider, Historical   pantoprazole (PROTONIX) 40 mg tablet Take 40 mg by mouth daily. Other, MD Viktor     Patient Active Problem List   Diagnosis Code    Acid reflux K21.9    Neuropathy G62.9    Depressed F32.9    Asthma J45.909    FSGS (focal segmental glomerulosclerosis) N05.1    Anemia D64.9    Diabetes 1.5, managed as type 2 ([address] E13.9    Menorrhagia with regular cycle N92.0    Lumbar radiculopathy M54.16    Severe obesity (BMI 35.0-39. 9) E66.01     Patient Active Problem List    Diagnosis Date Noted    Severe obesity (BMI 35.0-39.9) 07/24/2018    Lumbar radiculopathy 03/12/2018    Depressed 09/05/2017    Asthma 09/05/2017    FSGS (focal segmental glomerulosclerosis) 09/05/2017    Anemia 09/05/2017    Diabetes 1.5, managed as type 2 ([facility] [address] 09/05/2017    Menorrhagia with regular cycle 09/05/2017    Acid reflux     Neuropathy      Current Outpatient Medications   Medication Sig Dispense Refill    metFORMIN ER (GLUCOPHAGE XR) 500 mg tablet Take 1 Tab by mouth two (2) times daily (with meals). 60 Tab 3    ondansetron (ZOFRAN ODT) 4 mg disintegrating tablet Take 1 Tab by mouth every eight (8) hours as needed for Nausea. 14 Tab 0    losartan (COZAAR) 25 mg tablet Take  by mouth daily.  MULTIVITAMIN PO Take 3 gummies by mouth twice a day.  allergy injection Allergy injections once a week.       Fe/calcium carb/vitamin D3/min (IRON-CALCIUM CARB-VIT D3-MIN PO) Take  by mouth.  albuterol (PROVENTIL HFA, VENTOLIN HFA, PROAIR HFA) 90 mcg/actuation inhaler Take 2 Puffs by inhalation.  mometasone-formoterol (DULERA) 100-5 mcg/actuation HFA inhaler Take 2 Puffs by inhalation two (2) times a day. 1 Inhaler 0    omega 3-dha-epa-fish oil (FISH OIL) 60- mg cap Take 1,000 mg by mouth daily.  glucose blood VI test strips (ASCENSIA AUTODISC VI, ONE TOUCH ULTRA TEST VI) strip Blood sugar check twice daily.  Blood-Glucose Meter monitoring kit Use as directed to check blood sugar twice a day.  cycloSPORINE modified (GENGRAF, NEORAL) 25 mg capsule Take 25 mg by mouth two (2) times a day.  Lancets misc Blood sugar check twice a day.  pantoprazole (PROTONIX) 40 mg tablet Take 40 mg by mouth daily. No Known Allergies  Past Medical History:   Diagnosis Date    Acid reflux     Anemia     Asthma     Depressed     Diabetes 1.5, [facility] as type 2 [facility] [address]     FSGS (focal segmental glomerulosclerosis)     Gestational diabetes     Ill-defined condition     nfsg    Neuropathy     SAB (spontaneous )     Vaginal delivery     X2     Past Surgical History:   Procedure Laterality Date    HX CHOLECYSTECTOMY      HX GYN      btl    HX HERNIA REPAIR      HX TUBAL LIGATION      LAP UMBILICAL HERNIA REPAIR      LAP,TUBAL CAUTERY       Family History   Problem Relation Age of Onset    Diabetes Mother     Hypertension Mother     Stroke Mother     Asthma Mother     Diabetes Father     Hypertension Father     Cancer Paternal Aunt     Heart Disease Son      Social History     Tobacco Use    Smoking status: Former Smoker     Packs/day: 1.00     Years: 10.00     Pack years: 10.00     Types: Cigarettes     Last attempt to quit: 2017     Years since quitting: 3.6    Smokeless tobacco: Never Used   Substance Use Topics    Alcohol use:  Yes     Alcohol/week: 6.0 standard drinks     Types: 3 Glasses of wine, 3 Cans of beer per week     Comment: PER MONTH       Review of Systems   Musculoskeletal:        Leg pain       Objective:   No flowsheet data found. General: alert, cooperative, no distress   Mental  status: normal mood, behavior, speech, dress, motor activity, and thought processes, able to follow commands   HENT: NCAT   Neck: no visualized mass   Resp: no respiratory distress   Neuro: no gross deficits   Skin: no discoloration or lesions of concern on visible areas   Psychiatric: normal affect, consistent with stated mood, no evidence of hallucinations     Additional exam findings: We discussed the expected course, resolution and complications of the diagnosis(es) in detail. Medication risks, benefits, costs, interactions, and alternatives were discussed as indicated. I advised her to contact the office if her condition worsens, changes or fails to improve as anticipated. She expressed understanding with the diagnosis(es) and plan. Maria Alejandra Gross, who was evaluated through a patient-initiated, synchronous (real-time) audio-video encounter, and/or her healthcare decision maker, is aware that it is a billable service, with coverage as determined by her insurance carrier. She provided verbal consent to proceed: Yes, and patient identification was verified. It was conducted pursuant to the emergency declaration under the [address] [place] [address] authority and the [employer] General Act. A caregiver was present when appropriate. Ability to conduct physical exam was limited. I was in the office. The patient was at home.       Perlita Marino NP

## 2020-10-03 LAB
ABSOLUTE LYMPHOCYTE COUNT, 10803: 1.5 K/UL (ref 1–4.8)
ANION GAP SERPL CALC-SCNC: 12 MMOL/L (ref 3–15)
BASOPHILS # BLD: 0 K/UL (ref 0–0.2)
BASOPHILS NFR BLD: 0 % (ref 0–2)
BUN SERPL-MCNC: 9 MG/DL (ref 6–22)
CALCIUM SERPL-MCNC: 9.5 MG/DL (ref 8.4–10.5)
CHLORIDE SERPL-SCNC: 101 MMOL/L (ref 98–110)
CO2 SERPL-SCNC: 25 MMOL/L (ref 20–32)
CREAT SERPL-MCNC: 0.8 MG/DL (ref 0.5–1.2)
EOSINOPHIL # BLD: 0 K/UL (ref 0–0.5)
EOSINOPHIL NFR BLD: 0 % (ref 0–6)
ERYTHROCYTE [DISTWIDTH] IN BLOOD BY AUTOMATED COUNT: 15.7 % (ref 10–15.5)
GFRAA, 66117: >60
GFRNA, 66118: >60
GLUCOSE SERPL-MCNC: 128 MG/DL (ref 70–99)
GRANULOCYTES,GRANS: 82 % (ref 40–75)
HCT VFR BLD AUTO: 40 % (ref 35.1–46.5)
HGB BLD-MCNC: 11.7 G/DL (ref 11.7–15.5)
LYMPHOCYTES, LYMLT: 15 % (ref 20–45)
MCH RBC QN AUTO: 25 PG (ref 26–34)
MCHC RBC AUTO-ENTMCNC: 29 G/DL (ref 31–36)
MCV RBC AUTO: 84 FL (ref 81–99)
MONOCYTES # BLD: 0.3 K/UL (ref 0.1–1)
MONOCYTES NFR BLD: 3 % (ref 3–12)
NEUTROPHILS # BLD AUTO: 8.3 K/UL (ref 1.8–7.7)
PLATELET # BLD AUTO: 364 K/UL (ref 140–440)
PMV BLD AUTO: 10.9 FL (ref 9–13)
POTASSIUM SERPL-SCNC: 4.3 MMOL/L (ref 3.5–5.5)
RBC # BLD AUTO: 4.77 M/UL (ref 3.8–5.2)
SODIUM SERPL-SCNC: 138 MMOL/L (ref 133–145)
WBC # BLD AUTO: 10.1 K/UL (ref 4–11)

## 2020-10-05 NOTE — TELEPHONE ENCOUNTER
Pt called to check status of letter needed for work stating her medical conditions that would put her in high risk, has been requesting since 9/11/20 and now states job is at risk, pt requesting to speak to practice manager.  please adv [phone]

## 2020-10-12 NOTE — TELEPHONE ENCOUNTER
VM left to return call regarding letter requested from employer. After reviewing pt chart, pt seen by Dr. Vikas Patton back in 11/2019. pts conditions listed for letter has not been addressed by any providers at [facility]. Request must also be sent by employer.

## 2020-10-15 ENCOUNTER — TELEPHONE (OUTPATIENT)
Dept: FAMILY MEDICINE CLINIC | Age: 40
End: 2020-10-15

## 2020-10-15 NOTE — TELEPHONE ENCOUNTER
Chart reviewed. Pt has not had a visit for her DM and Asthma as noted.  Will go ahead and oblige pt's request for letter given her reported history of asthma and DM

## 2020-10-19 ENCOUNTER — TELEPHONE (OUTPATIENT)
Dept: FAMILY MEDICINE CLINIC | Age: 40
End: 2020-10-19

## 2020-10-19 NOTE — TELEPHONE ENCOUNTER
----- Message from Brittney Mora NP sent at 10/12/2020  2:57 AM EDT -----  Please advise patient her labs reveal she has mild anemia. Please inquire how is leg pain. Thanks!

## 2020-10-19 NOTE — TELEPHONE ENCOUNTER
Reviewed labs and recommendations given by the provider. Patient verbalized understanding. Patient comments she use to take iron pills but was instructed to stop. If needed, please send rx to pharmacy. Also patient comments she is still having intermittent leg pain. Not as bad but still there.

## 2020-10-23 RX ORDER — LANOLIN ALCOHOL/MO/W.PET/CERES
325 CREAM (GRAM) TOPICAL
Qty: 90 TAB | Refills: 0 | Status: SHIPPED | OUTPATIENT
Start: 2020-10-23

## 2020-12-11 NOTE — TELEPHONE ENCOUNTER
Last Visit: 11/13/19 with MD Hai Wheeler  Next Appointment: 12/23/20 with MD Hai Wheeler  Previous Refill Encounter(s): 3/13/20 #60 with 3 refills    Requested Prescriptions     Pending Prescriptions Disp Refills    metFORMIN ER (GLUCOPHAGE XR) 500 mg tablet 60 Tab 3     Sig: Take 1 Tab by mouth two (2) times daily (with meals).

## 2020-12-15 RX ORDER — METFORMIN HYDROCHLORIDE 500 MG/1
500 TABLET, EXTENDED RELEASE ORAL 2 TIMES DAILY WITH MEALS
Qty: 60 TAB | Refills: 3 | Status: SHIPPED | OUTPATIENT
Start: 2020-12-15 | End: 2020-12-23

## 2020-12-23 ENCOUNTER — VIRTUAL VISIT (OUTPATIENT)
Dept: FAMILY MEDICINE CLINIC | Age: 40
End: 2020-12-23
Payer: MEDICAID

## 2020-12-23 DIAGNOSIS — D50.8 OTHER IRON DEFICIENCY ANEMIA: ICD-10-CM

## 2020-12-23 DIAGNOSIS — E55.9 VITAMIN D DEFICIENCY: ICD-10-CM

## 2020-12-23 DIAGNOSIS — E13.9 DIABETES 1.5, MANAGED AS TYPE 2 (HCC): ICD-10-CM

## 2020-12-23 DIAGNOSIS — R20.2 PARESTHESIA: ICD-10-CM

## 2020-12-23 DIAGNOSIS — F41.9 ANXIETY: Primary | ICD-10-CM

## 2020-12-23 PROCEDURE — 99214 OFFICE O/P EST MOD 30 MIN: CPT | Performed by: FAMILY MEDICINE

## 2020-12-23 RX ORDER — METFORMIN HYDROCHLORIDE 500 MG/1
500 TABLET ORAL 2 TIMES DAILY WITH MEALS
Qty: 60 TAB | Refills: 6 | Status: SHIPPED | OUTPATIENT
Start: 2020-12-23 | End: 2021-04-30 | Stop reason: SDUPTHER

## 2020-12-23 RX ORDER — BUSPIRONE HYDROCHLORIDE 15 MG/1
15 TABLET ORAL 2 TIMES DAILY
Qty: 60 TAB | Refills: 6 | Status: SHIPPED | OUTPATIENT
Start: 2020-12-23 | End: 2022-01-30

## 2020-12-23 NOTE — PATIENT INSTRUCTIONS
Iron Deficiency Anemia: Care Instructions Your Care Instructions Anemia means that you don't have enough red blood cells. Red blood cells carry oxygen around your body. When you have anemia, it can make you pale, weak, and tired. Many things can cause anemia. The most common cause is loss of blood. This can happen if you have heavy menstrual periods. It can also happen if you have bleeding in your stomach or bowel. You can also get anemia if you don't have enough iron in your diet or if it's hard for your body to absorb iron. In some cases, pregnancy causes anemia. That's because a pregnant woman needs more iron. Your doctor may do more tests to find the cause of your anemia. If a disease or other health problem is causing it, your doctor will treat that problem. It's important to follow up with your doctor to make sure that your iron level returns to normal. 
Follow-up care is a key part of your treatment and safety. Be sure to make and go to all appointments, and call your doctor if you are having problems. It's also a good idea to know your test results and keep a list of the medicines you take. How can you care for yourself at home? · If your doctor recommended iron pills, take them as directed. ? Try to take the pills on an empty stomach. You can do this about 1 hour before or 2 hours after meals. But you may need to take iron with food to avoid an upset stomach. ? Do not take antacids or drink milk or anything with caffeine within 2 hours of when you take your iron. They can keep your body from absorbing the iron well. ? Vitamin C helps your body absorb iron. You may want to take iron pills with a glass of orange juice or some other food high in vitamin C. 
? Iron pills may cause stomach problems. These include heartburn, nausea, diarrhea, constipation, and cramps. It can help to drink plenty of fluids and include fruits, vegetables, and fiber in your diet. ? It's normal for iron pills to make your stool a greenish or grayish black. But internal bleeding can also cause dark stool. So it's important to tell your doctor about any color changes. ? Call your doctor if you think you are having a problem with your iron pills. Even after you start to feel better, it will take several months for your body to build up its supply of iron. ? If you miss a pill, don't take a double dose. ? Keep iron pills out of the reach of small children. Too much iron can be very dangerous. · Eat foods with a lot of iron. These include red meat, shellfish, poultry, and eggs. They also include beans, raisins, whole-grain bread, and leafy green vegetables. · Steam your vegetables. This is the best way to prepare them if you want to get as much iron as possible. · Be safe with medicines. Do not take nonsteroidal anti-inflammatory pain relievers unless your doctor tells you to. These include aspirin, naproxen (Aleve), and ibuprofen (Advil, Motrin). · Liquid iron can stain your teeth. But you can mix it with water or juice and drink it with a straw. Then it won't get on your teeth. When should you call for help? Call 911 anytime you think you may need emergency care. For example, call if: 
  · You passed out (lost consciousness). Call your doctor now or seek immediate medical care if: 
  · You are short of breath.  
  · You are dizzy or light-headed, or you feel like you may faint.  
  · You have new or worse bleeding. Watch closely for changes in your health, and be sure to contact your doctor if: 
  · You feel weaker or more tired than usual.  
  · You do not get better as expected. Where can you learn more? Go to http://www.gray.com/ Enter X285 in the search box to learn more about \"Iron Deficiency Anemia: Care Instructions. \" Current as of: November 8, 2019               Content Version: 12.6 © 8898-2424 [employer]. Care instructions adapted under license by [employer] (which disclaims liability or warranty for this information). If you have questions about a medical condition or this instruction, always ask your healthcare professional. [address] any warranty or liability for your use of this information.

## 2020-12-23 NOTE — PROGRESS NOTES
Manolo Matos is a 47 y.o. female who was seen by synchronous (real-time) audio-video technology on 12/23/2020 for Anxiety, Fatigue, and Urge Incontinence        Assessment & Plan:   Diagnoses and all orders for this visit:    1. Anxiety    2. Diabetes 1.5, managed as type 2 ([facility]  -     METABOLIC PANEL, COMPREHENSIVE; Future  -     LIPID PANEL; Future  -     HEMOGLOBIN A1C WITH EAG; Future    3. Other iron deficiency anemia  -     CBC WITH AUTOMATED DIFF; Future    4. Vitamin D deficiency  -     VITAMIN D, 25 HYDROXY; Future    Other orders  -     busPIRone (BUSPAR) 15 mg tablet; Take 1 Tab by mouth two (2) times a day. -     metFORMIN (GLUCOPHAGE) 500 mg tablet; Take 1 Tab by mouth two (2) times daily (with meals). As above, resume buspar as ordered  Refilled meds needed  Labs as ordered  Follow up with therapy as well as psychiatry as scheduled  Follow-up and Dispositions    · Return in about 6 weeks (around 2/3/2021). This has been fully explained to the patient, who indicates understanding. AVS is accessible thru [employer] and pt has been advised of same. 712  Subjective:     Pt c/o fatigue ; takes a MVI; she may not sleep well always; particularly if she is stressed or anxious. She has been anxious about COVID. She has been seeing a new counselor and this has helped some. She has been advised to see Psychiatry. She will be starting with Psychiatry soon. She has been having feelings of fear, nervousness and anxiety. Has family stressors;     States she has had some urge urinary incontinence; Has been present 4- 5 months;  No dysuria; Not helped by anything. She is followed by renal for kidney disease. She has been fatigued; She has a h/o DM, anemia and Vitamin D deficiency; she needs follow up labs on this. Prior to Admission medications    Medication Sig Start Date End Date Taking?  Authorizing Provider   ferrous sulfate 325 mg (65 mg iron) tablet Take 1 Tab by mouth Daily (before breakfast). 10/23/20  Yes Jenifer Turcios NP   montelukast (Singulair) 10 mg tablet Take 10 mg by mouth daily. Yes Provider, Historical   cetirizine (ZyrTEC) 10 mg tablet Take 10 mg by mouth daily. Yes Provider, Historical   ondansetron (ZOFRAN ODT) 4 mg disintegrating tablet Take 1 Tab by mouth every eight (8) hours as needed for Nausea. 1/24/20  Yes Zachariah Almonte MD   losartan (COZAAR) 25 mg tablet Take  by mouth daily. Yes Other, MD Viktor   MULTIVITAMIN PO Take 3 gummies by mouth twice a day. Yes Provider, Historical   allergy injection Allergy injections once a week. Yes Provider, Historical   albuterol (PROVENTIL HFA, VENTOLIN HFA, PROAIR HFA) 90 mcg/actuation inhaler Take 2 Puffs by inhalation. 7/26/18  Yes Provider, Historical   mometasone-formoterol (DULERA) 100-5 mcg/actuation HFA inhaler Take 2 Puffs by inhalation two (2) times a day. 7/18/18  Yes Abe Boswell MD   omega 3-dha-epa-fish oil (FISH OIL) 60- mg cap Take 1,000 mg by mouth daily. 10/31/17  Yes Provider, Historical   glucose blood VI test strips (ASCENSIA AUTODISC VI, ONE TOUCH ULTRA TEST VI) strip Blood sugar check twice daily. 3/1/17  Yes Provider, Historical   Blood-Glucose Meter monitoring kit Use as directed to check blood sugar twice a day. 3/1/17  Yes Provider, Historical   cycloSPORINE modified (GENGRAF, NEORAL) 25 mg capsule Take 25 mg by mouth two (2) times a day. 7/31/17  Yes Provider, Historical   Lancets misc Blood sugar check twice a day. 3/1/17  Yes Provider, Historical   pantoprazole (PROTONIX) 40 mg tablet Take 40 mg by mouth daily. Yes Other, MD Viktor   predniSONE (DELTASONE) 10 mg tablet Take  by mouth daily (with breakfast).     Provider, Historical     Patient Active Problem List    Diagnosis Date Noted    Severe obesity (BMI 35.0-39.9) 07/24/2018    Lumbar radiculopathy 03/12/2018    Depressed 09/05/2017    Asthma 09/05/2017    FSGS (focal segmental glomerulosclerosis) 09/05/2017    Anemia 2017    Diabetes 1.5, managed as type 2 ([facility] [address] 2017    Menorrhagia with regular cycle 2017    Acid reflux     Neuropathy      No Known Allergies  Past Medical History:   Diagnosis Date    Acid reflux     Anemia     Asthma     Depressed     Diabetes 1.5, managed as type 2 ([facility] [address] [facility]     FSGS (focal segmental glomerulosclerosis)     Gestational diabetes     Ill-defined condition     nfsg    Neuropathy     SAB (spontaneous ) 2004    Vaginal delivery     X2     Past Surgical History:   Procedure Laterality Date    HX CHOLECYSTECTOMY      HX GYN      btl    HX HERNIA REPAIR      HX TUBAL LIGATION      LAP UMBILICAL HERNIA REPAIR      LAP,TUBAL CAUTERY       Family History   Problem Relation Age of Onset    Diabetes Mother     Hypertension Mother     Stroke Mother     Asthma Mother     Diabetes Father     Hypertension Father     Cancer Paternal Aunt     Heart Disease Son      Social History     Tobacco Use    Smoking status: Former Smoker     Packs/day: 1.00     Years: 10.00     Pack years: 10.00     Types: Cigarettes     Quit date: 2017     Years since quitting: 3.8    Smokeless tobacco: Never Used   Substance Use Topics    Alcohol use: Yes     Alcohol/week: 6.0 standard drinks     Types: 3 Glasses of wine, 3 Cans of beer per week     Comment: PER MONTH       Review of Systems   Constitutional: Negative for chills and fever. Respiratory: Negative for cough and shortness of breath. Psychiatric/Behavioral: Negative for suicidal ideas. Objective:   No flowsheet data found.    General: alert, cooperative, no distress   Mental  status: normal mood, behavior, speech, dress, motor activity, and thought processes, able to follow commands   HENT: NCAT   Neck: no visualized mass   Resp: no respiratory distress   Neuro: no gross deficits   Skin: no discoloration or lesions of concern on visible areas   Psychiatric: normal affect, consistent with stated mood, no evidence of hallucinations     Additional exam findings: none      We discussed the expected course, resolution and complications of the diagnosis(es) in detail. Medication risks, benefits, costs, interactions, and alternatives were discussed as indicated. I advised her to contact the office if her condition worsens, changes or fails to improve as anticipated. She expressed understanding with the diagnosis(es) and plan. Darrel Ramesh, who was evaluated through a patient-initiated, synchronous (real-time) audio-video encounter, and/or her healthcare decision maker, is aware that it is a billable service, with coverage as determined by her insurance carrier. She provided verbal consent to proceed: Yes, and patient identification was verified. It was conducted pursuant to the emergency declaration under the [address] [place] [address] authority and the [employer] Act. A caregiver was present when appropriate. Ability to conduct physical exam was limited. I was in the office. The patient was at home.       Vivian Morales MD

## 2021-04-14 ENCOUNTER — TELEPHONE (OUTPATIENT)
Dept: FAMILY MEDICINE CLINIC | Age: 41
End: 2021-04-14

## 2021-04-14 NOTE — TELEPHONE ENCOUNTER
Spoke with pt regarding complaints of tingling in feet. Pt stated that she would like to schedule an appt for in office. Pt has been scheduled for an appt 4/30/21.

## 2021-04-14 NOTE — TELEPHONE ENCOUNTER
Pt called stating that she is a diabetic, and is currently experiencing lack of energy, and tingling in feet.      Please advise  [phone]

## 2021-04-14 NOTE — TELEPHONE ENCOUNTER
Left message for pt to contact the office regarding complaints of lack of energy and tingling in hands/feet.

## 2021-04-30 ENCOUNTER — OFFICE VISIT (OUTPATIENT)
Dept: FAMILY MEDICINE CLINIC | Age: 41
End: 2021-04-30
Payer: MEDICAID

## 2021-04-30 VITALS
HEIGHT: 65 IN | HEART RATE: 81 BPM | RESPIRATION RATE: 20 BRPM | BODY MASS INDEX: 38.82 KG/M2 | SYSTOLIC BLOOD PRESSURE: 134 MMHG | WEIGHT: 233 LBS | TEMPERATURE: 97.1 F | DIASTOLIC BLOOD PRESSURE: 82 MMHG | OXYGEN SATURATION: 98 %

## 2021-04-30 DIAGNOSIS — E13.9 DIABETES 1.5, MANAGED AS TYPE 2 (HCC): ICD-10-CM

## 2021-04-30 DIAGNOSIS — M54.16 LUMBAR RADICULOPATHY: ICD-10-CM

## 2021-04-30 DIAGNOSIS — R20.2 PARESTHESIA: Primary | ICD-10-CM

## 2021-04-30 DIAGNOSIS — N04.1 NEPHROTIC SYNDROME WITH FOCAL AND SEGMENTAL GLOMERULAR LESIONS: ICD-10-CM

## 2021-04-30 PROCEDURE — 99214 OFFICE O/P EST MOD 30 MIN: CPT | Performed by: NURSE PRACTITIONER

## 2021-04-30 RX ORDER — METFORMIN HYDROCHLORIDE 500 MG/1
500 TABLET ORAL 2 TIMES DAILY WITH MEALS
Qty: 180 TAB | Refills: 0 | Status: SHIPPED | OUTPATIENT
Start: 2021-04-30 | End: 2021-12-29 | Stop reason: SDUPTHER

## 2021-04-30 RX ORDER — LOSARTAN POTASSIUM 25 MG/1
25 TABLET ORAL DAILY
Qty: 90 TAB | Refills: 0 | Status: SHIPPED | OUTPATIENT
Start: 2021-04-30 | End: 2021-07-12

## 2021-04-30 NOTE — PROGRESS NOTES
Vanda Zaidi is a 47 y.o. female who was seen in clinic today (4/30/2021) for Tingling (intermittent hands and feet )  . Assessment & Plan:   Diagnoses and all orders for this visit:    1. Paresthesia  -     VITAMIN B12 & FOLATE; Future  -     REFERRAL TO NEUROLOGY    2. Diabetes 1.5, managed as type 2 ([facility] [address]  -      DIABETES FOOT EXAM    3. Lumbar radiculopathy    4. Nephrotic syndrome with focal and segmental glomerular lesions    Other orders  -     metFORMIN (GLUCOPHAGE) 500 mg tablet; Take 1 Tab by mouth two (2) times daily (with meals). -     losartan (Cozaar) 25 mg tablet; Take 1 Tab by mouth daily. Forms completed and copy obtained for EMR. Stretches provided for hand tingling, suspect possible carpal tunnel. I have discussed the diagnosis with the patient and the intended plan as seen in the above orders. The patient has received an after-visit summary and questions were answered concerning future plans. I have discussed medication side effects and warnings with the patient as well. Patient agreeable with above plan and verbalizes understanding. Follow-up and Dispositions    · Return in about 3 months (around 7/30/2021) for DM, in office follow up with Dr. Meme Blackmon. Subjective:   Patient reports she has been experiencing numbness/tingling in her feet that is intermittent for the last month. Admits to mid lower back pain. Denies pain radiating down legs. Denies any aggravating or alleviating factors. Reports she had paresthesias in her feet when she was pregnant with her daughter and dx with gestational diabetes. Patient states her glucose is ranging 80s fasting and 140 90mins post prandial.    Patient requesting ADA form completion to continue to work from home and not perform in home visits due to having high risk conditions of CKD, asthma, DM. 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  Comments in home visits require foot checks, vitals and assessments. Lab Results   Component Value Date/Time    Sodium 138 10/02/2020 10:54 AM    Potassium 4.3 10/02/2020 10:54 AM    Chloride 101 10/02/2020 10:54 AM    CO2 25 10/02/2020 10:54 AM    Anion gap 12.0 10/02/2020 10:54 AM    Glucose 128 (H) 10/02/2020 10:54 AM    BUN 9 10/02/2020 10:54 AM    Creatinine 0.8 10/02/2020 10:54 AM    BUN/Creatinine ratio 17 01/23/2020 10:14 PM    GFR est AA >60 01/23/2020 10:14 PM    GFR est non-AA >60 01/23/2020 10:14 PM    Calcium 9.5 10/02/2020 10:54 AM    Bilirubin, total 0.2 05/08/2019 12:12 PM    Alk. phosphatase 81 05/08/2019 12:12 PM    Protein, total 6.7 05/08/2019 12:12 PM    Albumin 2.8 (L) 05/08/2019 12:12 PM    Globulin 3.9 05/08/2019 12:12 PM    A-G Ratio 0.7 (L) 05/08/2019 12:12 PM    ALT (SGPT) 19 05/08/2019 12:12 PM    AST (SGOT) 24 05/08/2019 12:12 PM     Lab Results   Component Value Date/Time    VITAMIN D, 25-HYDROXY 18.1 (L) 11/13/2019 01:33 PM       Lab Results   Component Value Date/Time    WBC 10.1 10/02/2020 10:54 AM    Hemoglobin (POC) 12.7 10/31/2018 11:55 AM    HGB 11.7 10/02/2020 10:54 AM    HCT 40.0 10/02/2020 10:54 AM    PLATELET 447 80/34/2888 10:54 AM    MCV 84 10/02/2020 10:54 AM     Wt Readings from Last 3 Encounters:   04/30/21 233 lb (105.7 kg)   01/23/20 230 lb (104.3 kg)   11/15/19 229 lb (103.9 kg)     Temp Readings from Last 3 Encounters:   04/30/21 97.1 °F (36.2 °C) (Temporal)   01/24/20 97.2 °F (36.2 °C)   11/13/19 98.1 °F (36.7 °C) (Oral)     BP Readings from Last 3 Encounters:   04/30/21 134/82   01/24/20 116/72   11/15/19 132/89     Pulse Readings from Last 3 Encounters:   04/30/21 81   01/24/20 75   11/15/19 80       Prior to Admission medications    Medication Sig Start Date End Date Taking? Authorizing Provider   ergocalciferol, vitamin D2, (VITAMIN D2 PO) Take  by mouth. Yes Provider, Historical   busPIRone (BUSPAR) 15 mg tablet Take 1 Tab by mouth two (2) times a day.  12/23/20  Yes Ozzy Butcher MD   metFORMIN (GLUCOPHAGE) 500 mg tablet Take 1 Tab by mouth two (2) times daily (with meals). 12/23/20  Yes Ozzy Butcher MD   ferrous sulfate 325 mg (65 mg iron) tablet Take 1 Tab by mouth Daily (before breakfast). 10/23/20  Yes Neheimas Bustillos NP   montelukast (Singulair) 10 mg tablet Take 10 mg by mouth daily. Yes Provider, Historical   cetirizine (ZyrTEC) 10 mg tablet Take 10 mg by mouth daily. Yes Provider, Historical   ondansetron (ZOFRAN ODT) 4 mg disintegrating tablet Take 1 Tab by mouth every eight (8) hours as needed for Nausea. 1/24/20  Yes Britney Browne MD   losartan (COZAAR) 25 mg tablet Take  by mouth daily. Yes Other, MD Viktor   MULTIVITAMIN PO Take 3 gummies by mouth twice a day. Yes Provider, Historical   allergy injection Allergy injections once a week. Yes Provider, Historical   albuterol (PROVENTIL HFA, VENTOLIN HFA, PROAIR HFA) 90 mcg/actuation inhaler Take 2 Puffs by inhalation. 7/26/18  Yes Provider, Historical   mometasone-formoterol (DULERA) 100-5 mcg/actuation HFA inhaler Take 2 Puffs by inhalation two (2) times a day. 7/18/18  Yes Hari Singh MD   omega 3-dha-epa-fish oil (FISH OIL) 60- mg cap Take 1,000 mg by mouth daily. 10/31/17  Yes Provider, Historical   glucose blood VI test strips (ASCENSIA AUTODISC VI, ONE TOUCH ULTRA TEST VI) strip Blood sugar check twice daily. 3/1/17  Yes Provider, Historical   cycloSPORINE modified (GENGRAF, NEORAL) 25 mg capsule Take 25 mg by mouth two (2) times a day. 7/31/17  Yes Provider, Historical   Lancets misc Blood sugar check twice a day. 3/1/17  Yes Provider, Historical   pantoprazole (PROTONIX) 40 mg tablet Take 40 mg by mouth daily. Yes Other, MD Viktor   predniSONE (DELTASONE) 10 mg tablet Take  by mouth daily (with breakfast). Provider, Historical   Blood-Glucose Meter monitoring kit Use as directed to check blood sugar twice a day.  3/1/17   Provider, Historical       The following sections were reviewed & updated as appropriate: PMH, PSH, FH, and SH. Review of Systems   Constitutional: Negative for activity change, appetite change, chills, fatigue and fever. Respiratory: Negative for chest tightness and shortness of breath. Cardiovascular: Negative for chest pain. Musculoskeletal: Positive for back pain (lower back). Neurological: Positive for numbness (bilateral hands/feet). Negative for dizziness and headaches. Objective:     Visit Vitals  /82 (BP 1 Location: Left arm, BP Patient Position: Sitting, BP Cuff Size: Adult)   Pulse 81   Temp 97.1 °F (36.2 °C) (Temporal)   Resp 20   Ht 5' 5\" (1.651 m)   Wt 233 lb (105.7 kg)   LMP 04/15/2021   SpO2 98%   BMI 38.77 kg/m²      Physical Exam  Constitutional:       General: She is not in acute distress. Appearance: She is well-developed. HENT:      Head: Normocephalic and atraumatic. Neck:      Musculoskeletal: Normal range of motion and neck supple. Vascular: No carotid bruit. Cardiovascular:      Rate and Rhythm: Normal rate and regular rhythm. Heart sounds: Normal heart sounds. No murmur. No friction rub. No gallop. Pulmonary:      Effort: Pulmonary effort is normal.      Breath sounds: Normal breath sounds. No decreased breath sounds, wheezing, rhonchi or rales. Abdominal:      General: Bowel sounds are normal.      Palpations: Abdomen is soft. Tenderness: There is no abdominal tenderness. Musculoskeletal:      Lumbar back: She exhibits tenderness. Feet:      Comments: Diabetic foot exam:     Left Foot:   Visual Exam: normal    Pulse DP: 2+ (normal)   Filament test: normal sensation    Vibratory sensation: normal      Right Foot:   Visual Exam: normal    Pulse DP: 2+ (normal)   Filament test: normal sensation    Vibratory sensation: normal    Lymphadenopathy:      Cervical: No cervical adenopathy. Skin:     General: Skin is warm and dry.    Neurological:      Mental Status: She is alert and oriented to person, place, and time. Disclaimer: The patient understands our medical plan. Alternatives have been explained and offered. The risks, benefits and significant side effects of all medications have been reviewed. Anticipated time course and progression of condition reviewed. All questions have been addressed. She is encouraged to employ the information provided in the after visit summary, which was reviewed. Where applicable, she is instructed to call the clinic if she has not been notified either by phone or through [address] with the results of her tests or with an appointment plan for any referrals within 1 week(s). No news is not good news; it's no news. The patient  is to call if her condition worsens or fails to improve or if significant side effects are experienced. Aspects of this note may have been generated using voice recognition software. Despite editing, there may be unrecognized errors.        Cynthia Lance NP

## 2021-04-30 NOTE — PATIENT INSTRUCTIONS
Carpal Tunnel Syndrome: Exercises  Introduction  Here are some examples of exercises for you to try. The exercises may be suggested for a condition or for rehabilitation. Start each exercise slowly. Ease off the exercises if you start to have pain. You will be told when to start these exercises and which ones will work best for you. Warm-up stretches  When you no longer have pain or numbness, you can do exercises to help prevent carpal tunnel syndrome from coming back. Do not do any stretch or movement that is uncomfortable or painful. 1. Rotate your wrist up, down, and from side to side. Repeat 4 times. 2. Stretch your fingers far apart. Relax them, and then stretch them again. Repeat 4 times. 3. Stretch your thumb by pulling it back gently, holding it, and then releasing it. Repeat 4 times. How to do the exercises  Prayer stretch   1. Start with your palms together in front of your chest just below your chin. 2. Slowly lower your hands toward your waistline, keeping your hands close to your stomach and your palms together until you feel a mild to moderate stretch under your forearms. 3. Hold for at least 15 to 30 seconds. Repeat 2 to 4 times. Wrist flexor stretch   1. Extend your arm in front of you with your palm up. 2. Bend your wrist, pointing your hand toward the floor. 3. With your other hand, gently bend your wrist farther until you feel a mild to moderate stretch in your forearm. 4. Hold for at least 15 to 30 seconds. Repeat 2 to 4 times. Wrist extensor stretch   1. Repeat steps 1 through 4 of the stretch above, but begin with your extended hand palm down. Follow-up care is a key part of your treatment and safety. Be sure to make and go to all appointments, and call your doctor if you are having problems. It's also a good idea to know your test results and keep a list of the medicines you take. Where can you learn more?   Go to http://www.gray.com/  Enter K521 in the search box to learn more about \"Carpal Tunnel Syndrome: Exercises. \"  Current as of: November 16, 2020               Content Version: 12.8  © 2006-2021 [employer]. Care instructions adapted under license by [employer] (which disclaims liability or warranty for this information). If you have questions about a medical condition or this instruction, always ask your healthcare professional. [address] any warranty or liability for your use of this information. Numbness and Tingling: Care Instructions  Your Care Instructions     Many things can cause numbness or tingling. Swelling may put pressure on a nerve. This could cause you to lose feeling or have a pins-and-needles sensation on part of your body. Nerves may be damaged from trauma, toxins, or diseases, such as diabetes or multiple sclerosis (MS). Sometimes, though, the cause is not clear. If there is no clear reason for your symptoms, and you are not having any other symptoms, your doctor may suggest watching and waiting for a while to see if the numbness or tingling goes away on its own. Your doctor may want you to have blood or nerve tests to find the cause of your symptoms. Follow-up care is a key part of your treatment and safety. Be sure to make and go to all appointments, and call your doctor if you are having problems. It's also a good idea to know your test results and keep a list of the medicines you take. How can you care for yourself at home? · If your doctor prescribes medicine, take it exactly as directed. Call your doctor if you think you are having a problem with your medicine. · If you have any swelling, put ice or a cold pack on the area for 10 to 20 minutes at a time. Put a thin cloth between the ice and your skin. When should you call for help? Call 911 anytime you think you may need emergency care.  For example, call if:    · You have weakness, numbness, or tingling in both legs.     · You lose bowel or bladder control.     · You have symptoms of a stroke. These may include:  ? Sudden numbness, tingling, weakness, or loss of movement in your face, arm, or leg, especially on only one side of your body. ? Sudden vision changes. ? Sudden trouble speaking. ? Sudden confusion or trouble understanding simple statements. ? Sudden problems with walking or balance. ? A sudden, severe headache that is different from past headaches. Watch closely for changes in your health, and be sure to contact your doctor if you have any problems, or if:    · You do not get better as expected. Where can you learn more? Go to http://www.krueger.com/  Enter U128 in the search box to learn more about \"Numbness and Tingling: Care Instructions. \"  Current as of: August 4, 2020               Content Version: 12.8  © 4977-0874 [employer]. Care instructions adapted under license by [employer] (which disclaims liability or warranty for this information). If you have questions about a medical condition or this instruction, always ask your healthcare professional. [address] any warranty or liability for your use of this information.

## 2021-05-01 LAB
25(OH)D3 SERPL-MCNC: 46.8 NG/ML (ref 32–100)
A-G RATIO,AGRAT: 1.4 RATIO (ref 1.1–2.6)
ABSOLUTE LYMPHOCYTE COUNT, 10803: 2.4 K/UL (ref 1–4.8)
ALBUMIN SERPL-MCNC: 4 G/DL (ref 3.5–5)
ALP SERPL-CCNC: 71 U/L (ref 25–115)
ALT SERPL-CCNC: 17 U/L (ref 5–40)
ANION GAP SERPL CALC-SCNC: 10 MMOL/L (ref 3–15)
AST SERPL W P-5'-P-CCNC: 15 U/L (ref 10–37)
AVG GLU, 10930: 114 MG/DL (ref 91–123)
BASOPHILS # BLD: 0 K/UL (ref 0–0.2)
BASOPHILS NFR BLD: 1 % (ref 0–2)
BILIRUB SERPL-MCNC: 0.2 MG/DL (ref 0.2–1.2)
BUN SERPL-MCNC: 14 MG/DL (ref 6–22)
CALCIUM SERPL-MCNC: 9.2 MG/DL (ref 8.4–10.5)
CHLORIDE SERPL-SCNC: 101 MMOL/L (ref 98–110)
CHOLEST SERPL-MCNC: 216 MG/DL (ref 110–200)
CO2 SERPL-SCNC: 27 MMOL/L (ref 20–32)
CREAT SERPL-MCNC: 0.8 MG/DL (ref 0.5–1.2)
EOSINOPHIL # BLD: 0.1 K/UL (ref 0–0.5)
EOSINOPHIL NFR BLD: 1 % (ref 0–6)
ERYTHROCYTE [DISTWIDTH] IN BLOOD BY AUTOMATED COUNT: 14.7 % (ref 10–15.5)
FOLATE,FOL: 14.7 NG/ML
GFRAA, 66117: >60
GFRNA, 66118: >60
GLOBULIN,GLOB: 2.8 G/DL (ref 2–4)
GLUCOSE SERPL-MCNC: 102 MG/DL (ref 70–99)
GRANULOCYTES,GRANS: 52 % (ref 40–75)
HBA1C MFR BLD HPLC: 5.6 % (ref 4.8–5.6)
HCT VFR BLD AUTO: 39.6 % (ref 35.1–46.5)
HDLC SERPL-MCNC: 3.9 MG/DL (ref 0–5)
HDLC SERPL-MCNC: 56 MG/DL
HGB BLD-MCNC: 12.1 G/DL (ref 11.7–15.5)
LDL/HDL RATIO,LDHD: 2.6
LDLC SERPL CALC-MCNC: 145 MG/DL (ref 50–99)
LYMPHOCYTES, LYMLT: 37 % (ref 20–45)
MCH RBC QN AUTO: 26 PG (ref 26–34)
MCHC RBC AUTO-ENTMCNC: 31 G/DL (ref 31–36)
MCV RBC AUTO: 84 FL (ref 81–99)
MONOCYTES # BLD: 0.7 K/UL (ref 0.1–1)
MONOCYTES NFR BLD: 10 % (ref 3–12)
NEUTROPHILS # BLD AUTO: 3.4 K/UL (ref 1.8–7.7)
NON-HDL CHOLESTEROL, 011976: 160 MG/DL
PLATELET # BLD AUTO: 310 K/UL (ref 140–440)
PMV BLD AUTO: 10.7 FL (ref 9–13)
POTASSIUM SERPL-SCNC: 4.8 MMOL/L (ref 3.5–5.5)
PROT SERPL-MCNC: 6.8 G/DL (ref 6.4–8.3)
RBC # BLD AUTO: 4.7 M/UL (ref 3.8–5.2)
SODIUM SERPL-SCNC: 138 MMOL/L (ref 133–145)
TRIGL SERPL-MCNC: 73 MG/DL (ref 40–149)
VIT B12 SERPL-MCNC: 749 PG/ML (ref 211–911)
VLDLC SERPL CALC-MCNC: 15 MG/DL (ref 8–30)
WBC # BLD AUTO: 6.6 K/UL (ref 4–11)

## 2021-07-12 NOTE — TELEPHONE ENCOUNTER
Last Visit: 12/23/20 with MD Carlos Dixon  Next Appointment: 8/2/21 with MD Carlos Dixon  Previous Refill Encounter(s): 4/30/21 #90    Requested Prescriptions     Pending Prescriptions Disp Refills    losartan (COZAAR) 25 mg tablet [Pharmacy Med Name: LOSARTAN POTASSIUM 25 MG TAB] 90 Tablet 1     Sig: Take 1 Tablet by mouth daily.

## 2021-07-15 RX ORDER — LOSARTAN POTASSIUM 25 MG/1
25 TABLET ORAL DAILY
Qty: 90 TABLET | Refills: 1 | Status: SHIPPED | OUTPATIENT
Start: 2021-07-15 | End: 2022-06-11

## 2021-08-02 ENCOUNTER — OFFICE VISIT (OUTPATIENT)
Dept: FAMILY MEDICINE CLINIC | Age: 41
End: 2021-08-02
Payer: COMMERCIAL

## 2021-08-02 VITALS
OXYGEN SATURATION: 97 % | TEMPERATURE: 96.7 F | BODY MASS INDEX: 39.05 KG/M2 | RESPIRATION RATE: 20 BRPM | DIASTOLIC BLOOD PRESSURE: 86 MMHG | HEIGHT: 65 IN | SYSTOLIC BLOOD PRESSURE: 143 MMHG | WEIGHT: 234.4 LBS | HEART RATE: 80 BPM

## 2021-08-02 DIAGNOSIS — E13.9 DIABETES 1.5, MANAGED AS TYPE 2 (HCC): Primary | ICD-10-CM

## 2021-08-02 DIAGNOSIS — I10 ESSENTIAL HYPERTENSION: ICD-10-CM

## 2021-08-02 DIAGNOSIS — E55.9 VITAMIN D DEFICIENCY: ICD-10-CM

## 2021-08-02 DIAGNOSIS — R53.83 OTHER FATIGUE: ICD-10-CM

## 2021-08-02 PROCEDURE — 99214 OFFICE O/P EST MOD 30 MIN: CPT | Performed by: FAMILY MEDICINE

## 2021-08-02 RX ORDER — OXYBUTYNIN CHLORIDE 10 MG/1
10 TABLET, EXTENDED RELEASE ORAL DAILY
Qty: 30 TABLET | Refills: 6 | Status: SHIPPED | OUTPATIENT
Start: 2021-08-02

## 2021-08-02 NOTE — PROGRESS NOTES
HPI:  Jose Strong is a 47 y.o. female who presents today with   Chief Complaint   Patient presents with    Diabetes     f/u      Patient has diabetes. She is on Metformin. She attempts a diabetic diet. She is in need of test strips and lancets. Her last A1c was stable. Lab Results   Component Value Date/Time    Hemoglobin A1c 5.6 04/30/2021 10:55 AM     HTN: BP is up. Has not yet taken her BP meds this am;  She also took the stairs this am coming into the office and feels like this may be contributory. Still c/o \" low energy\" ; still feels tired; she is due for blood work. Has urge incontinence with multiple accidents. She feels like she needs a medication to help with the symptoms. She has vitamin D deficiency and needs a recheck on this value as well. She does take a vitamin D supplement.     3 most recent PHQ Screens 4/30/2021   Little interest or pleasure in doing things Not at all   Feeling down, depressed, irritable, or hopeless Not at all   Total Score PHQ 2 0   Trouble falling or staying asleep, or sleeping too much Not at all   Feeling tired or having little energy Not at all   Poor appetite, weight loss, or overeating Not at all   Feeling bad about yourself - or that you are a failure or have let yourself or your family down Not at all   Trouble concentrating on things such as school, work, reading, or watching TV Not at all   Moving or speaking so slowly that other people could have noticed; or the opposite being so fidgety that others notice Not at all   Thoughts of being better off dead, or hurting yourself in some way Not at all   PHQ 9 Score 0   How difficult have these problems made it for you to do your work, take care of your home and get along with others Not difficult at all               PMH,  Meds, Allergies, Family History, Social history reviewed      Current Outpatient Medications   Medication Sig Dispense Refill    oxybutynin chloride XL (DITROPAN XL) 10 mg CR tablet Take 1 Tablet by mouth daily. 30 Tablet 6    losartan (COZAAR) 25 mg tablet Take 1 Tablet by mouth daily. 90 Tablet 1    metFORMIN (GLUCOPHAGE) 500 mg tablet Take 1 Tab by mouth two (2) times daily (with meals). 180 Tab 0    ergocalciferol, vitamin D2, (VITAMIN D2 PO) Take  by mouth.  busPIRone (BUSPAR) 15 mg tablet Take 1 Tab by mouth two (2) times a day. 60 Tab 6    ferrous sulfate 325 mg (65 mg iron) tablet Take 1 Tab by mouth Daily (before breakfast). 90 Tab 0    montelukast (Singulair) 10 mg tablet Take 10 mg by mouth daily.  cetirizine (ZyrTEC) 10 mg tablet Take 10 mg by mouth daily.  ondansetron (ZOFRAN ODT) 4 mg disintegrating tablet Take 1 Tab by mouth every eight (8) hours as needed for Nausea. 14 Tab 0    MULTIVITAMIN PO Take 3 gummies by mouth twice a day.  allergy injection Allergy injections once a week.  albuterol (PROVENTIL HFA, VENTOLIN HFA, PROAIR HFA) 90 mcg/actuation inhaler Take 2 Puffs by inhalation.  mometasone-formoterol (DULERA) 100-5 mcg/actuation HFA inhaler Take 2 Puffs by inhalation two (2) times a day. 1 Inhaler 0    omega 3-dha-epa-fish oil (FISH OIL) 60- mg cap Take 1,000 mg by mouth daily.  glucose blood VI test strips (ASCENSIA AUTODISC VI, ONE TOUCH ULTRA TEST VI) strip Blood sugar check twice daily.  Blood-Glucose Meter monitoring kit Use as directed to check blood sugar twice a day.  cycloSPORINE modified (GENGRAF, NEORAL) 25 mg capsule Take 25 mg by mouth two (2) times a day.  Lancets misc Blood sugar check twice a day.  pantoprazole (PROTONIX) 40 mg tablet Take 40 mg by mouth daily.           No Known Allergies               ROS as per HPI      Visit Vitals  BP (!) 143/86   Pulse 80   Temp (!) 96.7 °F (35.9 °C) (Temporal)   Resp 20   Ht 5' 5\" (1.651 m)   Wt 234 lb 6.4 oz (106.3 kg)   LMP 07/20/2021   SpO2 97%   BMI 39.01 kg/m²     Physical Exam   General appearance: alert, cooperative, no distress, appears stated age  Neck: supple, symmetrical, trachea midline, no adenopathy, thyroid: not enlarged, symmetric, no tenderness/mass/nodules, no carotid bruit and no JVD  Lungs: clear to auscultation bilaterally  Heart: regular rate and rhythm, S1, S2 normal, no murmur, click, rub or gallop    Extremities: extremities normal, atraumatic, no cyanosis or edema    Assessment/Plan:  Diagnoses and all orders for this visit:    1. Diabetes 1.5, managed as type 2 ([facility] [address]    2. Essential hypertension  -     LIPID PANEL; Future  -     METABOLIC PANEL, COMPREHENSIVE; Future    3. Vitamin D deficiency  -     VITAMIN D, 25 HYDROXY; Future    4. Other fatigue  -     CBC WITH AUTOMATED DIFF; Future  -     TSH 3RD GENERATION; Future    Other orders  -     oxybutynin chloride XL (DITROPAN XL) 10 mg CR tablet; Take 1 Tablet by mouth daily. As above  All not completely controlled  Add Ditropan as ordered  Monitor blood pressure. This may be up today because patient has not yet taken her medications  Labs as ordered  An After Visit Summary was printed and given to the patient. This has been fully explained to the patient, who indicates understanding. Follow-up and Dispositions    · Return in about 4 months (around 12/2/2021) for diabetes, htn.             Geetha Hopson MD

## 2021-08-02 NOTE — PROGRESS NOTES
Anthony Mendez presents today for   Chief Complaint   Patient presents with    Diabetes     f/u       Is someone accompanying this pt? no    Is the patient using any DME equipment during OV? no    Depression Screening:  3 most recent PHQ Screens 4/30/2021   Little interest or pleasure in doing things Not at all   Feeling down, depressed, irritable, or hopeless Not at all   Total Score PHQ 2 0   Trouble falling or staying asleep, or sleeping too much Not at all   Feeling tired or having little energy Not at all   Poor appetite, weight loss, or overeating Not at all   Feeling bad about yourself - or that you are a failure or have let yourself or your family down Not at all   Trouble concentrating on things such as school, work, reading, or watching TV Not at all   Moving or speaking so slowly that other people could have noticed; or the opposite being so fidgety that others notice Not at all   Thoughts of being better off dead, or hurting yourself in some way Not at all   PHQ 9 Score 0   How difficult have these problems made it for you to do your work, take care of your home and get along with others Not difficult at all       Learning Assessment:  Learning Assessment 11/30/2016   PRIMARY LEARNER Patient   PRIMARY LANGUAGE ENGLISH   LEARNER PREFERENCE PRIMARY LISTENING   ANSWERED BY patient   Stanislaw Scherer Maintenance reviewed and discussed and ordered per Provider. Health Maintenance Due   Topic Date Due    Hepatitis C Screening  Never done    MICROALBUMIN Q1  Never done    Eye Exam Retinal or Dilated  Never done    COVID-19 Vaccine (1) Never done    DTaP/Tdap/Td series (1 - Tdap) Never done    Pneumococcal 0-64 years (2 of 4 - PPSV23) 12/29/2016    PAP AKA CERVICAL CYTOLOGY  11/19/2021   . Coordination of Care:  1. Have you been to the ER, urgent care clinic since your last visit? Yes [facility]- Hospitalized since your last visit?      2. Have you seen or consulted any other health care providers outside of the [address] [place] [address] since your last visit? Include any pap smears or colon screening.

## 2021-08-02 NOTE — PATIENT INSTRUCTIONS
Fatigue: Care Instructions  Your Care Instructions     Fatigue is a feeling of tiredness, exhaustion, or lack of energy. You may feel fatigue because of too much or not enough activity. It can also come from stress, lack of sleep, boredom, and poor diet. Many medical problems, such as viral infections, can cause fatigue. Emotional problems, especially depression, are often the cause of fatigue. Fatigue is most often a symptom of another problem. Treatment for fatigue depends on the cause. For example, if you have fatigue because you have a certain health problem, treating this problem also treats your fatigue. If depression or anxiety is the cause, treatment may help. Follow-up care is a key part of your treatment and safety. Be sure to make and go to all appointments, and call your doctor if you are having problems. It's also a good idea to know your test results and keep a list of the medicines you take. How can you care for yourself at home? · Get regular exercise. But don't overdo it. Go back and forth between rest and exercise. · Get plenty of rest.  · Eat a healthy diet. Do not skip meals, especially breakfast.  · Reduce your use of caffeine, tobacco, and alcohol. Caffeine is most often found in coffee, tea, cola drinks, and chocolate. · Limit medicines that can cause fatigue. This includes tranquilizers and cold and allergy medicines. When should you call for help? Watch closely for changes in your health, and be sure to contact your doctor if:    · You have new symptoms such as fever or a rash.     · Your fatigue gets worse.     · You have been feeling down, depressed, or hopeless. Or you may have lost interest in things that you usually enjoy.     · You are not getting better as expected. Where can you learn more? Go to http://www.gray.com/  Enter [id number] in the search box to learn more about \"Fatigue: Care Instructions. \"  Current as of: February 26, 2020               Content Version: 12.8  © 9421-5451 [employer]. Care instructions adapted under license by [employer] (which disclaims liability or warranty for this information). If you have questions about a medical condition or this instruction, always ask your healthcare professional. [address] any warranty or liability for your use of this information.

## 2021-08-03 LAB
25(OH)D3 SERPL-MCNC: 36 NG/ML (ref 32–100)
A-G RATIO,AGRAT: 1.6 RATIO (ref 1.1–2.6)
ABSOLUTE LYMPHOCYTE COUNT, 10803: 2.4 K/UL (ref 1–4.8)
ALBUMIN SERPL-MCNC: 4.1 G/DL (ref 3.5–5)
ALP SERPL-CCNC: 66 U/L (ref 25–115)
ALT SERPL-CCNC: 18 U/L (ref 5–40)
ANION GAP SERPL CALC-SCNC: 13 MMOL/L (ref 3–15)
AST SERPL W P-5'-P-CCNC: 14 U/L (ref 10–37)
BASOPHILS # BLD: 0 K/UL (ref 0–0.2)
BASOPHILS NFR BLD: 0 % (ref 0–2)
BILIRUB SERPL-MCNC: 0.3 MG/DL (ref 0.2–1.2)
BUN SERPL-MCNC: 12 MG/DL (ref 6–22)
CALCIUM SERPL-MCNC: 9.5 MG/DL (ref 8.4–10.5)
CHLORIDE SERPL-SCNC: 101 MMOL/L (ref 98–110)
CHOLEST SERPL-MCNC: 209 MG/DL (ref 110–200)
CO2 SERPL-SCNC: 25 MMOL/L (ref 20–32)
CREAT SERPL-MCNC: 0.7 MG/DL (ref 0.5–1.2)
EOSINOPHIL # BLD: 0.1 K/UL (ref 0–0.5)
EOSINOPHIL NFR BLD: 1 % (ref 0–6)
ERYTHROCYTE [DISTWIDTH] IN BLOOD BY AUTOMATED COUNT: 16.4 % (ref 10–15.5)
GFRAA, 66117: >60
GFRNA, 66118: >60
GLOBULIN,GLOB: 2.6 G/DL (ref 2–4)
GLUCOSE SERPL-MCNC: 97 MG/DL (ref 70–99)
GRANULOCYTES,GRANS: 55 % (ref 40–75)
HCT VFR BLD AUTO: 41.3 % (ref 35.1–46.5)
HDLC SERPL-MCNC: 3.8 MG/DL (ref 0–5)
HDLC SERPL-MCNC: 55 MG/DL
HGB BLD-MCNC: 12 G/DL (ref 11.7–15.5)
LDL/HDL RATIO,LDHD: 2.3
LDLC SERPL CALC-MCNC: 128 MG/DL (ref 50–99)
LYMPHOCYTES, LYMLT: 34 % (ref 20–45)
MCH RBC QN AUTO: 25 PG (ref 26–34)
MCHC RBC AUTO-ENTMCNC: 29 G/DL (ref 31–36)
MCV RBC AUTO: 87 FL (ref 81–99)
MONOCYTES # BLD: 0.7 K/UL (ref 0.1–1)
MONOCYTES NFR BLD: 10 % (ref 3–12)
NEUTROPHILS # BLD AUTO: 4 K/UL (ref 1.8–7.7)
NON-HDL CHOLESTEROL, 011976: 154 MG/DL
PLATELET # BLD AUTO: 293 K/UL (ref 140–440)
PMV BLD AUTO: 10.9 FL (ref 9–13)
POTASSIUM SERPL-SCNC: 4.6 MMOL/L (ref 3.5–5.5)
PROT SERPL-MCNC: 6.7 G/DL (ref 6.4–8.3)
RBC # BLD AUTO: 4.76 M/UL (ref 3.8–5.2)
SODIUM SERPL-SCNC: 139 MMOL/L (ref 133–145)
TRIGL SERPL-MCNC: 130 MG/DL (ref 40–149)
TSH SERPL DL<=0.005 MIU/L-ACNC: 1.53 MCU/ML (ref 0.27–4.2)
VLDLC SERPL CALC-MCNC: 26 MG/DL (ref 8–30)
WBC # BLD AUTO: 7.2 K/UL (ref 4–11)

## 2021-12-29 RX ORDER — METFORMIN HYDROCHLORIDE 500 MG/1
500 TABLET ORAL 2 TIMES DAILY WITH MEALS
Qty: 180 TABLET | Refills: 0 | Status: SHIPPED | OUTPATIENT
Start: 2021-12-29 | End: 2022-04-22

## 2021-12-29 RX ORDER — METFORMIN HYDROCHLORIDE 500 MG/1
TABLET, EXTENDED RELEASE ORAL
Qty: 60 TABLET | Refills: 3 | OUTPATIENT
Start: 2021-12-29

## 2022-01-30 RX ORDER — BUSPIRONE HYDROCHLORIDE 15 MG/1
TABLET ORAL
Qty: 60 TABLET | Refills: 6 | Status: SHIPPED | OUTPATIENT
Start: 2022-01-30

## 2022-03-04 ENCOUNTER — HOSPITAL ENCOUNTER (OUTPATIENT)
Dept: GENERAL RADIOLOGY | Age: 42
Discharge: HOME | End: 2022-03-04
Payer: MEDICAID

## 2022-03-04 DIAGNOSIS — M54.9 BACK PAIN: ICD-10-CM

## 2022-03-04 DIAGNOSIS — M54.16 LUMBAR RADICULOPATHY: ICD-10-CM

## 2022-03-04 PROCEDURE — 72110 X-RAY EXAM L-2 SPINE 4/>VWS: CPT

## 2022-03-18 PROBLEM — E13.9 DIABETES 1.5, MANAGED AS TYPE 2 (HCC): Status: ACTIVE | Noted: 2017-09-05

## 2022-03-19 PROBLEM — J45.909 ASTHMA: Status: ACTIVE | Noted: 2017-09-05

## 2022-03-19 PROBLEM — M54.16 LUMBAR RADICULOPATHY: Status: ACTIVE | Noted: 2018-03-12

## 2022-03-19 PROBLEM — N92.0 MENORRHAGIA WITH REGULAR CYCLE: Status: ACTIVE | Noted: 2017-09-05

## 2022-03-19 PROBLEM — N05.1 FSGS (FOCAL SEGMENTAL GLOMERULOSCLEROSIS): Status: ACTIVE | Noted: 2017-09-05

## 2022-03-20 PROBLEM — D64.9 ANEMIA: Status: ACTIVE | Noted: 2017-09-05

## 2022-03-20 PROBLEM — F32.A DEPRESSED: Status: ACTIVE | Noted: 2017-09-05

## 2022-04-22 RX ORDER — METFORMIN HYDROCHLORIDE 500 MG/1
TABLET ORAL
Qty: 180 TABLET | Refills: 0 | Status: SHIPPED | OUTPATIENT
Start: 2022-04-22

## 2022-05-13 RX ORDER — LOSARTAN POTASSIUM 25 MG/1
25 TABLET ORAL DAILY
Qty: 90 TABLET | Refills: 1 | OUTPATIENT
Start: 2022-05-13

## 2022-06-11 RX ORDER — LOSARTAN POTASSIUM 25 MG/1
TABLET ORAL
Qty: 90 TABLET | Refills: 1 | Status: SHIPPED | OUTPATIENT
Start: 2022-06-11

## 2023-08-05 ENCOUNTER — HOSPITAL ENCOUNTER (EMERGENCY)
Facility: HOSPITAL | Age: 43
Discharge: HOME | End: 2023-08-05
Attending: STUDENT IN AN ORGANIZED HEALTH CARE EDUCATION/TRAINING PROGRAM
Payer: MEDICAID

## 2023-08-05 ENCOUNTER — APPOINTMENT (OUTPATIENT)
Facility: HOSPITAL | Age: 43
End: 2023-08-05
Payer: MEDICAID

## 2023-08-05 VITALS
HEART RATE: 74 BPM | DIASTOLIC BLOOD PRESSURE: 67 MMHG | SYSTOLIC BLOOD PRESSURE: 127 MMHG | OXYGEN SATURATION: 99 % | TEMPERATURE: 98.2 F | RESPIRATION RATE: 17 BRPM

## 2023-08-05 DIAGNOSIS — R07.89 CHEST WALL PAIN: Primary | ICD-10-CM

## 2023-08-05 LAB
ANION GAP SERPL CALC-SCNC: 3 MMOL/L (ref 3–18)
BASOPHILS # BLD: 0 K/UL (ref 0–0.1)
BASOPHILS NFR BLD: 0 % (ref 0–2)
BUN SERPL-MCNC: 14 MG/DL (ref 7–18)
BUN/CREAT SERPL: 15 (ref 12–20)
CALCIUM SERPL-MCNC: 8.9 MG/DL (ref 8.5–10.1)
CHLORIDE SERPL-SCNC: 107 MMOL/L (ref 100–111)
CO2 SERPL-SCNC: 28 MMOL/L (ref 21–32)
CREAT SERPL-MCNC: 0.92 MG/DL (ref 0.6–1.3)
DIFFERENTIAL METHOD BLD: ABNORMAL
EOSINOPHIL # BLD: 0.1 K/UL (ref 0–0.4)
EOSINOPHIL NFR BLD: 2 % (ref 0–5)
ERYTHROCYTE [DISTWIDTH] IN BLOOD BY AUTOMATED COUNT: 14.2 % (ref 11.6–14.5)
GLUCOSE SERPL-MCNC: 114 MG/DL (ref 74–99)
HCT VFR BLD AUTO: 37.1 % (ref 35–45)
HGB BLD-MCNC: 11.9 G/DL (ref 12–16)
IMM GRANULOCYTES # BLD AUTO: 0 K/UL (ref 0–0.04)
IMM GRANULOCYTES NFR BLD AUTO: 0 % (ref 0–0.5)
LYMPHOCYTES # BLD: 3.9 K/UL (ref 0.9–3.6)
LYMPHOCYTES NFR BLD: 52 % (ref 21–52)
MCH RBC QN AUTO: 26 PG (ref 24–34)
MCHC RBC AUTO-ENTMCNC: 32.1 G/DL (ref 31–37)
MCV RBC AUTO: 81.2 FL (ref 78–100)
MONOCYTES # BLD: 0.6 K/UL (ref 0.05–1.2)
MONOCYTES NFR BLD: 8 % (ref 3–10)
NEUTS SEG # BLD: 2.9 K/UL (ref 1.8–8)
NEUTS SEG NFR BLD: 38 % (ref 40–73)
NRBC # BLD: 0 K/UL (ref 0–0.01)
NRBC BLD-RTO: 0 PER 100 WBC
PLATELET # BLD AUTO: 206 K/UL (ref 135–420)
PMV BLD AUTO: 10.2 FL (ref 9.2–11.8)
POTASSIUM SERPL-SCNC: 3.8 MMOL/L (ref 3.5–5.5)
RBC # BLD AUTO: 4.57 M/UL (ref 4.2–5.3)
SODIUM SERPL-SCNC: 138 MMOL/L (ref 136–145)
TROPONIN I SERPL HS-MCNC: 5 NG/L (ref 0–54)
WBC # BLD AUTO: 7.5 K/UL (ref 4.6–13.2)

## 2023-08-05 PROCEDURE — 93005 ELECTROCARDIOGRAM TRACING: CPT | Performed by: STUDENT IN AN ORGANIZED HEALTH CARE EDUCATION/TRAINING PROGRAM

## 2023-08-05 PROCEDURE — 84484 ASSAY OF TROPONIN QUANT: CPT

## 2023-08-05 PROCEDURE — 6370000000 HC RX 637 (ALT 250 FOR IP): Performed by: STUDENT IN AN ORGANIZED HEALTH CARE EDUCATION/TRAINING PROGRAM

## 2023-08-05 PROCEDURE — 85025 COMPLETE CBC W/AUTO DIFF WBC: CPT

## 2023-08-05 PROCEDURE — 71045 X-RAY EXAM CHEST 1 VIEW: CPT

## 2023-08-05 PROCEDURE — 99285 EMERGENCY DEPT VISIT HI MDM: CPT

## 2023-08-05 PROCEDURE — 80048 BASIC METABOLIC PNL TOTAL CA: CPT

## 2023-08-05 RX ORDER — HYDROCODONE BITARTRATE AND ACETAMINOPHEN 5; 325 MG/1; MG/1
1 TABLET ORAL
Status: COMPLETED | OUTPATIENT
Start: 2023-08-05 | End: 2023-08-05

## 2023-08-05 RX ORDER — METHOCARBAMOL 750 MG/1
750 TABLET, FILM COATED ORAL 4 TIMES DAILY
Qty: 40 TABLET | Refills: 0 | Status: SHIPPED | OUTPATIENT
Start: 2023-08-05 | End: 2023-08-15

## 2023-08-05 RX ADMIN — HYDROCODONE BITARTRATE AND ACETAMINOPHEN 1 TABLET: 5; 325 TABLET ORAL at 23:03

## 2023-08-05 ASSESSMENT — ENCOUNTER SYMPTOMS
NAUSEA: 0
CHEST TIGHTNESS: 1
ABDOMINAL PAIN: 0
VOMITING: 0
SHORTNESS OF BREATH: 0
DIARRHEA: 0

## 2023-08-06 LAB
EKG ATRIAL RATE: 77 BPM
EKG DIAGNOSIS: NORMAL
EKG P AXIS: 61 DEGREES
EKG P-R INTERVAL: 180 MS
EKG Q-T INTERVAL: 374 MS
EKG QRS DURATION: 82 MS
EKG QTC CALCULATION (BAZETT): 423 MS
EKG R AXIS: 5 DEGREES
EKG T AXIS: 20 DEGREES
EKG VENTRICULAR RATE: 77 BPM

## 2023-08-06 PROCEDURE — 93010 ELECTROCARDIOGRAM REPORT: CPT | Performed by: INTERNAL MEDICINE

## 2023-08-06 NOTE — ED PROVIDER NOTES
EMERGENCY DEPARTMENT HISTORY AND PHYSICAL EXAM      Date: 8/5/2023  Patient Name: Pauline Medina    History of Presenting Illness     Chief Complaint   Patient presents with    Chest Pain       Patient is a 51-year-old female with past medical history of diabetes, asthma CKD, anemia presenting to the emergency department with complaints of chest pain. Started approximately 2 hours prior to arrival.  Patient states that she just finished holding at that time. She does not remember any specific trauma or inciting event. States that the pain is worse with abduction of her arms across her chest and with turning her torso. She denies any fever, nausea or vomiting, cough, shortness of breath. PCP: Luciana Hernández MD    Current Facility-Administered Medications   Medication Dose Route Frequency Provider Last Rate Last Admin    HYDROcodone-acetaminophen (NORCO) 5-325 MG per tablet 1 tablet  1 tablet Oral NOW Adebayo Meek DO         Current Outpatient Medications   Medication Sig Dispense Refill    methocarbamol (ROBAXIN-750) 750 MG tablet Take 1 tablet by mouth 4 times daily for 10 days 40 tablet 0    Lancets MISC Blood sugar check twice a day. Multiple Vitamin (MULTIVITAMIN PO) Take 3 gummies by mouth twice a day.       albuterol sulfate HFA (PROVENTIL;VENTOLIN;PROAIR) 108 (90 Base) MCG/ACT inhaler Inhale 2 puffs into the lungs      busPIRone (BUSPAR) 15 MG tablet TAKE 1 TABLET BY MOUTH TWICE A DAY      cetirizine (ZYRTEC) 10 MG tablet Take 10 mg by mouth daily      cycloSPORINE modified (NEORAL) 25 MG capsule Take 25 mg by mouth 2 times daily      ferrous sulfate (IRON 325) 325 (65 Fe) MG tablet Take 325 mg by mouth every morning (before breakfast)      losartan (COZAAR) 25 MG tablet TAKE 1 TABLET BY MOUTH EVERY DAY      metFORMIN (GLUCOPHAGE) 500 MG tablet TAKE 1 TABLET BY MOUTH TWICE A DAY WITH MEALS      mometasone-formoterol (DULERA) 100-5 MCG/ACT inhaler Inhale 2 puffs into the lungs 2 times

## 2024-08-08 ENCOUNTER — HOSPITAL ENCOUNTER (EMERGENCY)
Facility: HOSPITAL | Age: 44
Discharge: HOME | End: 2024-08-08
Payer: MEDICAID

## 2024-08-08 ENCOUNTER — APPOINTMENT (OUTPATIENT)
Facility: HOSPITAL | Age: 44
End: 2024-08-08
Payer: MEDICAID

## 2024-08-08 VITALS
SYSTOLIC BLOOD PRESSURE: 147 MMHG | HEART RATE: 78 BPM | TEMPERATURE: 98.6 F | RESPIRATION RATE: 18 BRPM | WEIGHT: 220 LBS | OXYGEN SATURATION: 99 % | HEIGHT: 66 IN | BODY MASS INDEX: 35.36 KG/M2 | DIASTOLIC BLOOD PRESSURE: 92 MMHG

## 2024-08-08 DIAGNOSIS — B34.9 VIRAL INFECTION: Primary | ICD-10-CM

## 2024-08-08 LAB
ALBUMIN SERPL-MCNC: 3.4 G/DL (ref 3.4–5)
ALBUMIN/GLOB SERPL: 0.8 (ref 0.8–1.7)
ALP SERPL-CCNC: 69 U/L (ref 45–117)
ALT SERPL-CCNC: 29 U/L (ref 13–56)
ANION GAP SERPL CALC-SCNC: 5 MMOL/L (ref 3–18)
AST SERPL-CCNC: 15 U/L (ref 10–38)
BASOPHILS # BLD: 0 K/UL (ref 0–0.1)
BASOPHILS NFR BLD: 1 % (ref 0–2)
BILIRUB SERPL-MCNC: 0.5 MG/DL (ref 0.2–1)
BUN SERPL-MCNC: 10 MG/DL (ref 7–18)
BUN/CREAT SERPL: 10 (ref 12–20)
CALCIUM SERPL-MCNC: 9.4 MG/DL (ref 8.5–10.1)
CHLORIDE SERPL-SCNC: 103 MMOL/L (ref 100–111)
CO2 SERPL-SCNC: 27 MMOL/L (ref 21–32)
CREAT SERPL-MCNC: 0.96 MG/DL (ref 0.6–1.3)
DIFFERENTIAL METHOD BLD: NORMAL
EKG ATRIAL RATE: 70 BPM
EKG DIAGNOSIS: NORMAL
EKG P AXIS: 35 DEGREES
EKG P-R INTERVAL: 172 MS
EKG Q-T INTERVAL: 396 MS
EKG QRS DURATION: 80 MS
EKG QTC CALCULATION (BAZETT): 427 MS
EKG R AXIS: 12 DEGREES
EKG T AXIS: 11 DEGREES
EKG VENTRICULAR RATE: 70 BPM
EOSINOPHIL # BLD: 0 K/UL (ref 0–0.4)
EOSINOPHIL NFR BLD: 1 % (ref 0–5)
ERYTHROCYTE [DISTWIDTH] IN BLOOD BY AUTOMATED COUNT: 14.5 % (ref 11.6–14.5)
FLUAV RNA SPEC QL NAA+PROBE: NOT DETECTED
FLUBV RNA SPEC QL NAA+PROBE: NOT DETECTED
GLOBULIN SER CALC-MCNC: 4.3 G/DL (ref 2–4)
GLUCOSE SERPL-MCNC: 90 MG/DL (ref 74–99)
HCT VFR BLD AUTO: 41.7 % (ref 35–45)
HGB BLD-MCNC: 13.1 G/DL (ref 12–16)
IMM GRANULOCYTES # BLD AUTO: 0 K/UL (ref 0–0.04)
IMM GRANULOCYTES NFR BLD AUTO: 0 % (ref 0–0.5)
LYMPHOCYTES # BLD: 3 K/UL (ref 0.9–3.6)
LYMPHOCYTES NFR BLD: 44 % (ref 21–52)
MCH RBC QN AUTO: 25 PG (ref 24–34)
MCHC RBC AUTO-ENTMCNC: 31.4 G/DL (ref 31–37)
MCV RBC AUTO: 79.7 FL (ref 78–100)
MONOCYTES # BLD: 0.6 K/UL (ref 0.05–1.2)
MONOCYTES NFR BLD: 9 % (ref 3–10)
NEUTS SEG # BLD: 3.1 K/UL (ref 1.8–8)
NEUTS SEG NFR BLD: 46 % (ref 40–73)
NRBC # BLD: 0 K/UL (ref 0–0.01)
NRBC BLD-RTO: 0 PER 100 WBC
PLATELET # BLD AUTO: 250 K/UL (ref 135–420)
PMV BLD AUTO: 9.9 FL (ref 9.2–11.8)
POTASSIUM SERPL-SCNC: 3.6 MMOL/L (ref 3.5–5.5)
PROT SERPL-MCNC: 7.7 G/DL (ref 6.4–8.2)
RBC # BLD AUTO: 5.23 M/UL (ref 4.2–5.3)
SARS-COV-2 RNA RESP QL NAA+PROBE: NOT DETECTED
SODIUM SERPL-SCNC: 135 MMOL/L (ref 136–145)
WBC # BLD AUTO: 6.8 K/UL (ref 4.6–13.2)

## 2024-08-08 PROCEDURE — 99285 EMERGENCY DEPT VISIT HI MDM: CPT

## 2024-08-08 PROCEDURE — 93005 ELECTROCARDIOGRAM TRACING: CPT

## 2024-08-08 PROCEDURE — 71046 X-RAY EXAM CHEST 2 VIEWS: CPT

## 2024-08-08 PROCEDURE — 85025 COMPLETE CBC W/AUTO DIFF WBC: CPT

## 2024-08-08 PROCEDURE — 93010 ELECTROCARDIOGRAM REPORT: CPT | Performed by: INTERNAL MEDICINE

## 2024-08-08 PROCEDURE — 87636 SARSCOV2 & INF A&B AMP PRB: CPT

## 2024-08-08 PROCEDURE — 80053 COMPREHEN METABOLIC PANEL: CPT

## 2024-08-08 PROCEDURE — 6370000000 HC RX 637 (ALT 250 FOR IP)

## 2024-08-08 RX ORDER — ACETAMINOPHEN 325 MG/1
650 TABLET ORAL EVERY 6 HOURS PRN
Qty: 60 TABLET | Refills: 3 | Status: SHIPPED | OUTPATIENT
Start: 2024-08-08

## 2024-08-08 RX ORDER — ACETAMINOPHEN 325 MG/1
650 TABLET ORAL
Status: COMPLETED | OUTPATIENT
Start: 2024-08-08 | End: 2024-08-08

## 2024-08-08 RX ORDER — GUAIFENESIN 600 MG/1
600 TABLET, EXTENDED RELEASE ORAL 2 TIMES DAILY
Qty: 30 TABLET | Refills: 0 | Status: SHIPPED | OUTPATIENT
Start: 2024-08-08 | End: 2024-08-23

## 2024-08-08 RX ADMIN — ACETAMINOPHEN 325MG 650 MG: 325 TABLET ORAL at 13:46

## 2024-08-08 ASSESSMENT — PAIN - FUNCTIONAL ASSESSMENT: PAIN_FUNCTIONAL_ASSESSMENT: NONE - DENIES PAIN

## 2024-08-08 NOTE — ED PROVIDER NOTES
EMERGENCY DEPARTMENT HISTORY AND PHYSICAL EXAM      Date: 8/8/2024  Patient Name: Steve Hackett    History of Presenting Illness     Chief Complaint   Patient presents with    Headache    Chest Congestion    Fatigue       History (Context): Steve Hackett is a 43 y.o. female  presents to the ED today with chief complaint of fatigue, generalized body aches, headache, diarrhea, chest congestion and CP. Patient admits to ill contact - son who has similar symptoms.  Patient reports that chest pain and diarrhea began yesterday.  Body aches, headache, fatigue, chest congestion began on Monday. Chest pain does not radiate to neck or shoulder.  Reports that chest pain worsens with any sort of movement.  No SOB.  No cough.  Has tried OTC Tylenol and Mucinex with some improvement to her symptoms. Patient was mainly concerned about CP which is what brought her to the ED today. She is requesting chest x-ray.    PMH: Hypertension, FSGS, diabetes, asthma  No medication allergies  Former smoker quit 10 years ago, does not vape, no recreational drug use      PCP: Memo Molina MD    Current Facility-Administered Medications   Medication Dose Route Frequency Provider Last Rate Last Admin    dextromethorphan-guaiFENesin (MUCINEX DM)  MG per extended release tablet 1 tablet  1 tablet Oral Once Ximena Morley MD         Current Outpatient Medications   Medication Sig Dispense Refill    acetaminophen (AMINOFEN) 325 MG tablet Take 2 tablets by mouth every 6 hours as needed for Pain 60 tablet 3    guaiFENesin (MUCINEX) 600 MG extended release tablet Take 1 tablet by mouth 2 times daily for 15 days 30 tablet 0    TRUE METRIX BLOOD GLUCOSE TEST strip use 1 TEST STRIP to TEST BLOOD SUGAR once daily      losartan (COZAAR) 100 MG tablet Take 1 tablet by mouth daily      Alcohol Swabs (ALCOHOL PREP) 70 % PADS USE TO CLEANSE AREA BEFORE TESTING BLOOD SUGAR once daily      azelastine (ASTELIN) 0.1 % nasal spray 137 sprays by

## 2024-08-08 NOTE — ED TRIAGE NOTES
Patient presented to the Emergency Dept with a complaint of fatigue, generalized body aches, headache, diarrhea and chest congestion which started on Monday     Patient admits to ill contact    Patient alert and oriented x 4, patient breathes freely on room air in nil cardiopulmonary distress